# Patient Record
Sex: FEMALE | Race: WHITE | NOT HISPANIC OR LATINO | Employment: OTHER | URBAN - METROPOLITAN AREA
[De-identification: names, ages, dates, MRNs, and addresses within clinical notes are randomized per-mention and may not be internally consistent; named-entity substitution may affect disease eponyms.]

---

## 2017-07-10 ENCOUNTER — OFFICE VISIT (OUTPATIENT)
Dept: FAMILY MEDICINE CLINIC | Facility: CLINIC | Age: 72
End: 2017-07-10

## 2017-07-10 VITALS
HEART RATE: 64 BPM | DIASTOLIC BLOOD PRESSURE: 80 MMHG | WEIGHT: 162 LBS | SYSTOLIC BLOOD PRESSURE: 130 MMHG | HEIGHT: 67 IN | RESPIRATION RATE: 16 BRPM | OXYGEN SATURATION: 94 % | TEMPERATURE: 98 F | BODY MASS INDEX: 25.43 KG/M2

## 2017-07-10 DIAGNOSIS — R09.81 SINUS CONGESTION: Primary | ICD-10-CM

## 2017-07-10 PROCEDURE — 99213 OFFICE O/P EST LOW 20 MIN: CPT | Performed by: NURSE PRACTITIONER

## 2017-07-10 RX ORDER — FLUTICASONE PROPIONATE 50 MCG
2 SPRAY, SUSPENSION (ML) NASAL DAILY
Qty: 1 EACH | Refills: 11 | Status: SHIPPED | OUTPATIENT
Start: 2017-07-10 | End: 2017-08-09

## 2017-07-10 NOTE — PROGRESS NOTES
Subjective   Brandee Cabello is a 71 y.o. female.     History of Present Illness   Brandee Cabello 71 y.o. female who presents for evaluation of sinus pressure and congestion. Symptoms include ear pressure, congestion, nasal blockage and headache.  Onset of symptoms was 1 week ago, unchanged since that time. Patient denies shortness of breath, wheezing, fever.   Evaluation to date: none Treatment to date:  OTC antihistamines.     The following portions of the patient's history were reviewed and updated as appropriate: allergies, current medications, past family history, past medical history, past social history, past surgical history and problem list.    Review of Systems   Constitutional: Negative for chills and fever.   HENT: Positive for sinus pressure. Negative for ear pain, postnasal drip and rhinorrhea.    Respiratory: Negative for cough.    Neurological: Positive for headaches.       Objective   Physical Exam   Constitutional: She is oriented to person, place, and time. She appears well-developed and well-nourished.   HENT:   Right Ear: Tympanic membrane, external ear and ear canal normal.   Left Ear: Tympanic membrane, external ear and ear canal normal.   Nose: Right sinus exhibits no maxillary sinus tenderness and no frontal sinus tenderness. Left sinus exhibits no maxillary sinus tenderness and no frontal sinus tenderness.   Mouth/Throat: Uvula is midline and oropharynx is clear and moist.   Cardiovascular: Normal rate and regular rhythm.    Pulmonary/Chest: Effort normal and breath sounds normal.   Neurological: She is alert and oriented to person, place, and time.   Skin: Skin is warm.   Psychiatric: She has a normal mood and affect. Judgment normal.   Vitals reviewed.      Assessment/Plan   Brandee was seen today for sinusitis.    Diagnoses and all orders for this visit:    Sinus congestion  -     fluticasone (FLONASE) 50 MCG/ACT nasal spray; 2 sprays into each nostril Daily for 30 days.

## 2017-09-26 ENCOUNTER — TELEPHONE (OUTPATIENT)
Dept: FAMILY MEDICINE CLINIC | Facility: CLINIC | Age: 72
End: 2017-09-26

## 2017-09-26 DIAGNOSIS — E78.2 MIXED HYPERLIPIDEMIA: ICD-10-CM

## 2017-09-26 RX ORDER — SIMVASTATIN 40 MG
40 TABLET ORAL NIGHTLY
Qty: 30 TABLET | Refills: 0 | Status: SHIPPED | OUTPATIENT
Start: 2017-09-26 | End: 2017-10-02 | Stop reason: SDUPTHER

## 2017-10-02 ENCOUNTER — OFFICE VISIT (OUTPATIENT)
Dept: FAMILY MEDICINE CLINIC | Facility: CLINIC | Age: 72
End: 2017-10-02

## 2017-10-02 VITALS
WEIGHT: 163 LBS | SYSTOLIC BLOOD PRESSURE: 132 MMHG | HEART RATE: 68 BPM | HEIGHT: 67 IN | BODY MASS INDEX: 25.58 KG/M2 | DIASTOLIC BLOOD PRESSURE: 78 MMHG | RESPIRATION RATE: 16 BRPM

## 2017-10-02 DIAGNOSIS — I10 ESSENTIAL HYPERTENSION: Primary | ICD-10-CM

## 2017-10-02 DIAGNOSIS — E78.2 MIXED HYPERLIPIDEMIA: ICD-10-CM

## 2017-10-02 PROCEDURE — 99213 OFFICE O/P EST LOW 20 MIN: CPT | Performed by: FAMILY MEDICINE

## 2017-10-02 RX ORDER — AMLODIPINE BESYLATE 10 MG/1
10 TABLET ORAL DAILY
Qty: 90 TABLET | Refills: 3 | Status: SHIPPED | OUTPATIENT
Start: 2017-10-02 | End: 2018-11-02 | Stop reason: SDUPTHER

## 2017-10-02 RX ORDER — SIMVASTATIN 40 MG
40 TABLET ORAL NIGHTLY
Qty: 90 TABLET | Refills: 3 | Status: SHIPPED | OUTPATIENT
Start: 2017-10-02 | End: 2018-11-02

## 2017-10-02 NOTE — PROGRESS NOTES
"Subjective   Brandee Cabello is a 72 y.o. female.     History of Present Illness     Chief Complaint:   Chief Complaint   Patient presents with   • Hypertension     MED REFILL - PT DECLINES MEDICARE WELLNESS EXAM TODAY - NO LABS DONE    • Hyperlipidemia       Brandee Cabello 72 y.o. female who presents today for Medical Management of the below listed issues and medication refills.  she has a problem list of   Patient Active Problem List   Diagnosis   • Hyperlipidemia   • Hypertension   • Reflux esophagitis   .  Since the last visit, she has overall felt well.  she has been compliant with   Current Outpatient Prescriptions:   •  amLODIPine (NORVASC) 10 MG tablet, Take 1 tablet by mouth Daily., Disp: 90 tablet, Rfl: 3  •  simvastatin (ZOCOR) 40 MG tablet, Take 1 tablet by mouth Every Night., Disp: 90 tablet, Rfl: 3.  she denies medication side effects.    All of the chronic condition(s) listed above are stable w/o issues.    /78  Pulse 68  Resp 16  Ht 67\" (170.2 cm)  Wt 163 lb (73.9 kg)  BMI 25.53 kg/m2    Results for orders placed or performed in visit on 09/02/16   Lipid panel   Result Value Ref Range    Total Cholesterol 184 100 - 199 mg/dL    Triglycerides 78 0 - 149 mg/dL    HDL Cholesterol 69 >39 mg/dL    VLDL Cholesterol 16 5 - 40 mg/dL    LDL Cholesterol  99 0 - 99 mg/dL   POCT Urinalysis Dipstick, Automated   Result Value Ref Range    Color Yellow Yellow, Straw, Dark Yellow, [place]    Clarity, UA Clear Clear    Glucose, UA Negative Negative mg/dL    Bilirubin Negative Negative    Ketones, UA Negative Negative    Specific Gravity  1.020 1.005 - 1.030    Blood, UA Negative Negative    pH, Urine 7.0 5.0 - 8.0    Protein, POC Negative Negative mg/dL    Urobilinogen, UA Normal Normal    Leukocytes Small (1+) (A) Negative    Nitrite, UA Negative Negative         The following portions of the patient's history were reviewed and updated as appropriate: allergies, current medications, past family history, past " medical history, past social history, past surgical history and problem list.    Review of Systems   Constitutional: Negative for activity change, chills, fatigue and fever.   Respiratory: Negative for cough and chest tightness.    Cardiovascular: Negative for chest pain and palpitations.   Gastrointestinal: Negative for abdominal pain and nausea.   Endocrine: Negative for cold intolerance.   Psychiatric/Behavioral: Negative for behavioral problems and dysphoric mood.       Objective   Physical Exam   Constitutional: She appears well-developed and well-nourished.   Neck: Neck supple. No thyromegaly present.   Cardiovascular: Normal rate and regular rhythm.    No murmur heard.  Pulmonary/Chest: Effort normal and breath sounds normal.   Abdominal: Bowel sounds are normal.   Psychiatric: She has a normal mood and affect. Her behavior is normal.   Nursing note and vitals reviewed.      Assessment/Plan   Brandee was seen today for hypertension and hyperlipidemia.    Diagnoses and all orders for this visit:    Essential hypertension  -     amLODIPine (NORVASC) 10 MG tablet; Take 1 tablet by mouth Daily.  -     Comprehensive metabolic panel  -     Lipid panel  -     CBC and Differential  -     TSH    Mixed hyperlipidemia  -     simvastatin (ZOCOR) 40 MG tablet; Take 1 tablet by mouth Every Night.  -     Lipid panel

## 2017-10-21 LAB
ALBUMIN SERPL-MCNC: 4.1 G/DL (ref 3.5–4.8)
ALBUMIN/GLOB SERPL: 1.6 {RATIO} (ref 1.2–2.2)
ALP SERPL-CCNC: 91 IU/L (ref 39–117)
ALT SERPL-CCNC: 17 IU/L (ref 0–32)
AST SERPL-CCNC: 19 IU/L (ref 0–40)
BASOPHILS # BLD AUTO: 0 X10E3/UL (ref 0–0.2)
BASOPHILS NFR BLD AUTO: 1 %
BILIRUB SERPL-MCNC: 0.4 MG/DL (ref 0–1.2)
BUN SERPL-MCNC: 10 MG/DL (ref 8–27)
BUN/CREAT SERPL: 14 (ref 12–28)
CALCIUM SERPL-MCNC: 9.3 MG/DL (ref 8.7–10.3)
CHLORIDE SERPL-SCNC: 100 MMOL/L (ref 96–106)
CHOLEST SERPL-MCNC: 175 MG/DL (ref 100–199)
CO2 SERPL-SCNC: 25 MMOL/L (ref 18–29)
CREAT SERPL-MCNC: 0.74 MG/DL (ref 0.57–1)
EOSINOPHIL # BLD AUTO: 0.1 X10E3/UL (ref 0–0.4)
EOSINOPHIL NFR BLD AUTO: 2 %
ERYTHROCYTE [DISTWIDTH] IN BLOOD BY AUTOMATED COUNT: 15.1 % (ref 12.3–15.4)
GFR SERPLBLD CREATININE-BSD FMLA CKD-EPI: 80 ML/MIN/1.73
GFR SERPLBLD CREATININE-BSD FMLA CKD-EPI: 92 ML/MIN/1.73
GLOBULIN SER CALC-MCNC: 2.6 G/DL (ref 1.5–4.5)
GLUCOSE SERPL-MCNC: 93 MG/DL (ref 65–99)
HCT VFR BLD AUTO: 41.7 % (ref 34–46.6)
HDLC SERPL-MCNC: 56 MG/DL
HGB BLD-MCNC: 13.8 G/DL (ref 11.1–15.9)
IMM GRANULOCYTES # BLD: 0 X10E3/UL (ref 0–0.1)
IMM GRANULOCYTES NFR BLD: 0 %
LDLC SERPL CALC-MCNC: 102 MG/DL (ref 0–99)
LYMPHOCYTES # BLD AUTO: 1.7 X10E3/UL (ref 0.7–3.1)
LYMPHOCYTES NFR BLD AUTO: 31 %
MCH RBC QN AUTO: 29.9 PG (ref 26.6–33)
MCHC RBC AUTO-ENTMCNC: 33.1 G/DL (ref 31.5–35.7)
MCV RBC AUTO: 90 FL (ref 79–97)
MONOCYTES # BLD AUTO: 0.6 X10E3/UL (ref 0.1–0.9)
MONOCYTES NFR BLD AUTO: 10 %
NEUTROPHILS # BLD AUTO: 3 X10E3/UL (ref 1.4–7)
NEUTROPHILS NFR BLD AUTO: 56 %
PLATELET # BLD AUTO: 300 X10E3/UL (ref 150–379)
POTASSIUM SERPL-SCNC: 4.2 MMOL/L (ref 3.5–5.2)
PROT SERPL-MCNC: 6.7 G/DL (ref 6–8.5)
RBC # BLD AUTO: 4.62 X10E6/UL (ref 3.77–5.28)
SODIUM SERPL-SCNC: 145 MMOL/L (ref 134–144)
TRIGL SERPL-MCNC: 86 MG/DL (ref 0–149)
TSH SERPL DL<=0.005 MIU/L-ACNC: 2.01 UIU/ML (ref 0.45–4.5)
VLDLC SERPL CALC-MCNC: 17 MG/DL (ref 5–40)
WBC # BLD AUTO: 5.4 X10E3/UL (ref 3.4–10.8)

## 2018-03-21 ENCOUNTER — OFFICE VISIT (OUTPATIENT)
Dept: FAMILY MEDICINE CLINIC | Facility: CLINIC | Age: 73
End: 2018-03-21

## 2018-03-21 VITALS
DIASTOLIC BLOOD PRESSURE: 83 MMHG | HEART RATE: 69 BPM | BODY MASS INDEX: 25.46 KG/M2 | SYSTOLIC BLOOD PRESSURE: 154 MMHG | OXYGEN SATURATION: 97 % | WEIGHT: 168 LBS | RESPIRATION RATE: 16 BRPM | HEIGHT: 68 IN | TEMPERATURE: 98.6 F

## 2018-03-21 DIAGNOSIS — J06.9 ACUTE URI: Primary | ICD-10-CM

## 2018-03-21 PROCEDURE — 99213 OFFICE O/P EST LOW 20 MIN: CPT | Performed by: FAMILY MEDICINE

## 2018-03-21 RX ORDER — AZITHROMYCIN 250 MG/1
TABLET, FILM COATED ORAL
Qty: 6 TABLET | Refills: 0 | Status: SHIPPED | OUTPATIENT
Start: 2018-03-21 | End: 2018-11-02

## 2018-03-21 RX ORDER — BENZONATATE 200 MG/1
200 CAPSULE ORAL 3 TIMES DAILY PRN
Qty: 21 CAPSULE | Refills: 1 | Status: SHIPPED | OUTPATIENT
Start: 2018-03-21 | End: 2018-11-02

## 2018-03-21 NOTE — PROGRESS NOTES
"Subjective   Brandee Cabello is a 72 y.o. female.     CC: URI    History of Present Illness     Brandee Cabello 72 y.o. female who presents for evaluation of cough, frontal headache pain.. Symptoms include congestion, post nasal drip and dry cough.  Onset of symptoms was 3 days ago, unchanged since that time. Patient denies hemoptysis.   Evaluation to date: none Treatment to date:  none.       The following portions of the patient's history were reviewed and updated as appropriate: allergies, current medications, past family history, past medical history, past social history, past surgical history and problem list.    Review of Systems   Constitutional: Negative for activity change, chills, fatigue and fever.   HENT: Positive for congestion and sinus pressure.    Respiratory: Positive for cough. Negative for chest tightness.    Cardiovascular: Negative for chest pain and palpitations.   Gastrointestinal: Negative for abdominal pain and nausea.   Endocrine: Negative for cold intolerance.   Psychiatric/Behavioral: Negative for behavioral problems and dysphoric mood.     /83   Pulse 69   Temp 98.6 °F (37 °C)   Resp 16   Ht 171.5 cm (67.5\")   Wt 76.2 kg (168 lb)   SpO2 97%   BMI 25.92 kg/m²     Objective   Physical Exam   Constitutional: She appears well-developed and well-nourished.   HENT:   Nose: Right sinus exhibits maxillary sinus tenderness. Left sinus exhibits maxillary sinus tenderness.   Neck: Neck supple. No thyromegaly present.   Cardiovascular: Normal rate and regular rhythm.    No murmur heard.  Pulmonary/Chest: Effort normal and breath sounds normal.   Lymphadenopathy:     She has no cervical adenopathy.   Psychiatric: She has a normal mood and affect. Her behavior is normal.   Nursing note and vitals reviewed.      Assessment/Plan   Brandee was seen today for cough, nasal congestion and sinusitis.    Diagnoses and all orders for this visit:    Acute URI  -     azithromycin (ZITHROMAX Z-SEE) 250 MG " tablet; Take 2 tablets the first day, then 1 tablet daily for 4 days.  -     benzonatate (TESSALON) 200 MG capsule; Take 1 capsule by mouth 3 (Three) Times a Day As Needed for Cough.    Pt to fill the Z-Pack if worsens.

## 2018-09-21 ENCOUNTER — OFFICE VISIT (OUTPATIENT)
Dept: RETAIL CLINIC | Facility: CLINIC | Age: 73
End: 2018-09-21

## 2018-09-21 VITALS
OXYGEN SATURATION: 100 % | HEART RATE: 81 BPM | TEMPERATURE: 98.4 F | DIASTOLIC BLOOD PRESSURE: 79 MMHG | SYSTOLIC BLOOD PRESSURE: 132 MMHG | RESPIRATION RATE: 18 BRPM

## 2018-09-21 DIAGNOSIS — J32.9 SINUSITIS, UNSPECIFIED CHRONICITY, UNSPECIFIED LOCATION: Primary | ICD-10-CM

## 2018-09-21 PROCEDURE — 99213 OFFICE O/P EST LOW 20 MIN: CPT | Performed by: NURSE PRACTITIONER

## 2018-09-21 RX ORDER — GUAIFENESIN 600 MG/1
600 TABLET, EXTENDED RELEASE ORAL 2 TIMES DAILY
Qty: 28 TABLET | Refills: 0 | Status: SHIPPED | OUTPATIENT
Start: 2018-09-21 | End: 2018-10-05

## 2018-09-21 RX ORDER — AZITHROMYCIN 250 MG/1
TABLET, FILM COATED ORAL
Qty: 6 TABLET | Refills: 0 | Status: SHIPPED | OUTPATIENT
Start: 2018-09-21 | End: 2018-11-02

## 2018-09-21 RX ORDER — PREDNISONE 20 MG/1
20 TABLET ORAL 2 TIMES DAILY
Qty: 10 TABLET | Refills: 0 | Status: SHIPPED | OUTPATIENT
Start: 2018-09-21 | End: 2018-11-02

## 2018-09-21 NOTE — PATIENT INSTRUCTIONS

## 2018-09-21 NOTE — PROGRESS NOTES
Subjective:     Brandee Cabello is a 73 y.o.     Sinusitis   This is a new (some antibiotic that tears her stomach up but knows whatever PCP gave her last time works) problem. The current episode started 1 to 4 weeks ago. There has been no fever. Associated symptoms include congestion (mild), ear pain (full), headaches, sinus pressure (teeth hurt) and a sore throat (mild). Past treatments include nothing.         The following portions of the patient's history were reviewed and updated as appropriate: allergies, current medications, past family history, past medical history, past social history, past surgical history and problem list.      Review of Systems   HENT: Positive for congestion (mild), ear pain (full), postnasal drip, sinus pain, sinus pressure (teeth hurt) and sore throat (mild).    Respiratory: Negative.    Cardiovascular:        Hx:hypertension   Neurological: Positive for headaches.         Objective:      Physical Exam   HENT:   Head: Normocephalic and atraumatic.   Right Ear: Ear canal normal. Right ear middle ear effusion: mild.   Left Ear: Ear canal normal. Left ear middle ear effusion: mild.   Nose: Right sinus exhibits frontal sinus tenderness. Right sinus exhibits no maxillary sinus tenderness. Left sinus exhibits frontal sinus tenderness. Left sinus exhibits no maxillary sinus tenderness.   Mild nasal congestion   Cardiovascular: Normal rate, regular rhythm, S1 normal, S2 normal and normal heart sounds.    Pulmonary/Chest: Effort normal and breath sounds normal.   Lymphadenopathy:     She has no cervical adenopathy.   Vitals reviewed.          Diagnoses and all orders for this visit:    Sinusitis, unspecified chronicity, unspecified location    Other orders  -     azithromycin (ZITHROMAX) 250 MG tablet; Take 2 tablets the first day, then 1 tablet daily for 4 days.  -     predniSONE (DELTASONE) 20 MG tablet; Take 1 tablet by mouth 2 (Two) Times a Day.  -     guaiFENesin (MUCINEX) 600 MG 12 hr  tablet; Take 1 tablet by mouth 2 (Two) Times a Day for 14 days.

## 2018-10-17 DIAGNOSIS — E78.2 MIXED HYPERLIPIDEMIA: ICD-10-CM

## 2018-10-17 RX ORDER — SIMVASTATIN 40 MG
40 TABLET ORAL NIGHTLY
Qty: 90 TABLET | Refills: 3 | OUTPATIENT
Start: 2018-10-17

## 2018-11-02 ENCOUNTER — OFFICE VISIT (OUTPATIENT)
Dept: FAMILY MEDICINE CLINIC | Facility: CLINIC | Age: 73
End: 2018-11-02

## 2018-11-02 VITALS
HEIGHT: 68 IN | HEART RATE: 68 BPM | BODY MASS INDEX: 25.31 KG/M2 | SYSTOLIC BLOOD PRESSURE: 139 MMHG | WEIGHT: 167 LBS | TEMPERATURE: 97.5 F | RESPIRATION RATE: 16 BRPM | DIASTOLIC BLOOD PRESSURE: 87 MMHG

## 2018-11-02 DIAGNOSIS — E78.2 MIXED HYPERLIPIDEMIA: ICD-10-CM

## 2018-11-02 DIAGNOSIS — I10 ESSENTIAL HYPERTENSION: ICD-10-CM

## 2018-11-02 PROCEDURE — 99214 OFFICE O/P EST MOD 30 MIN: CPT | Performed by: NURSE PRACTITIONER

## 2018-11-02 RX ORDER — SIMVASTATIN 40 MG
40 TABLET ORAL NIGHTLY
Qty: 90 TABLET | Refills: 3 | Status: CANCELLED | OUTPATIENT
Start: 2018-11-02

## 2018-11-02 RX ORDER — AMLODIPINE BESYLATE 10 MG/1
10 TABLET ORAL DAILY
Qty: 90 TABLET | Refills: 3 | Status: SHIPPED | OUTPATIENT
Start: 2018-11-02 | End: 2019-12-30 | Stop reason: SDUPTHER

## 2018-11-02 RX ORDER — ATORVASTATIN CALCIUM 20 MG/1
20 TABLET, FILM COATED ORAL DAILY
Qty: 90 TABLET | Refills: 3 | Status: SHIPPED | OUTPATIENT
Start: 2018-11-02 | End: 2019-10-27 | Stop reason: SDUPTHER

## 2018-11-02 NOTE — PROGRESS NOTES
Subjective   Brandee Cabello is a 73 y.o. female.     History of Present Illness   Brandee Cabello 73 y.o. female who presents today for routine follow up check and medication refills.  she has a history of   Patient Active Problem List   Diagnosis   • Hyperlipidemia   • Hypertension   • Reflux esophagitis   .  Since the last visit, she has overall felt well.  She has Hypertenision and is well controlled on medication and Hyperlipidemia and is well controlled on medication.  she has been compliant with current medications have reviewed them.  The patient denies medication side effects.    Results for orders placed or performed in visit on 10/02/17   Comprehensive metabolic panel   Result Value Ref Range    Glucose 93 65 - 99 mg/dL    BUN 10 8 - 27 mg/dL    Creatinine 0.74 0.57 - 1.00 mg/dL    eGFR Non African Am 80 >59 mL/min/1.73    eGFR African Am 92 >59 mL/min/1.73    BUN/Creatinine Ratio 14 12 - 28    Sodium 145 (H) 134 - 144 mmol/L    Potassium 4.2 3.5 - 5.2 mmol/L    Chloride 100 96 - 106 mmol/L    Total CO2 25 18 - 29 mmol/L    Calcium 9.3 8.7 - 10.3 mg/dL    Total Protein 6.7 6.0 - 8.5 g/dL    Albumin 4.1 3.5 - 4.8 g/dL    Globulin 2.6 1.5 - 4.5 g/dL    A/G Ratio 1.6 1.2 - 2.2    Total Bilirubin 0.4 0.0 - 1.2 mg/dL    Alkaline Phosphatase 91 39 - 117 IU/L    AST (SGOT) 19 0 - 40 IU/L    ALT (SGPT) 17 0 - 32 IU/L   Lipid panel   Result Value Ref Range    Total Cholesterol 175 100 - 199 mg/dL    Triglycerides 86 0 - 149 mg/dL    HDL Cholesterol 56 >39 mg/dL    VLDL Cholesterol 17 5 - 40 mg/dL    LDL Cholesterol  102 (H) 0 - 99 mg/dL   TSH   Result Value Ref Range    TSH 2.010 0.450 - 4.500 uIU/mL   CBC and Differential   Result Value Ref Range    WBC 5.4 3.4 - 10.8 x10E3/uL    RBC 4.62 3.77 - 5.28 x10E6/uL    Hemoglobin 13.8 11.1 - 15.9 g/dL    Hematocrit 41.7 34.0 - 46.6 %    MCV 90 79 - 97 fL    MCH 29.9 26.6 - 33.0 pg    MCHC 33.1 31.5 - 35.7 g/dL    RDW 15.1 12.3 - 15.4 %    Platelets 300 150 - 379 x10E3/uL     Neutrophil Rel % 56 Not Estab. %    Lymphocyte Rel % 31 Not Estab. %    Monocyte Rel % 10 Not Estab. %    Eosinophil Rel % 2 Not Estab. %    Basophil Rel % 1 Not Estab. %    Neutrophils Absolute 3.0 1.4 - 7.0 x10E3/uL    Lymphocytes Absolute 1.7 0.7 - 3.1 x10E3/uL    Monocytes Absolute 0.6 0.1 - 0.9 x10E3/uL    Eosinophils Absolute 0.1 0.0 - 0.4 x10E3/uL    Basophils Absolute 0.0 0.0 - 0.2 x10E3/uL    Immature Granulocyte Rel % 0 Not Estab. %    Immature Grans Absolute 0.0 0.0 - 0.1 x10E3/uL       The following portions of the patient's history were reviewed and updated as appropriate: allergies, current medications, past family history, past medical history, past social history, past surgical history and problem list.    Review of Systems   Constitutional: Negative for unexpected weight change.   Respiratory: Negative for shortness of breath.    Cardiovascular: Negative for chest pain and palpitations.   Endocrine: Negative for cold intolerance, heat intolerance, polydipsia, polyphagia and polyuria.   Musculoskeletal: Positive for myalgias.   Psychiatric/Behavioral: Negative for behavioral problems.       Objective   Physical Exam   Constitutional: She is oriented to person, place, and time. She appears well-developed and well-nourished.   Neck: Carotid bruit is not present.   Cardiovascular: Normal rate and regular rhythm.    Pulmonary/Chest: Effort normal and breath sounds normal.   Neurological: She is alert and oriented to person, place, and time.   Psychiatric: She has a normal mood and affect. Judgment normal.   Nursing note and vitals reviewed.      Assessment/Plan   Diagnoses and all orders for this visit:    Mixed hyperlipidemia  -     Comprehensive metabolic panel  -     Lipid panel  -     CBC and Differential  -     TSH    Essential hypertension  -     Comprehensive metabolic panel  -     Lipid panel  -     CBC and Differential  -     TSH  -     amLODIPine (NORVASC) 10 MG tablet; Take 1 tablet by mouth  Daily.    Other orders  -     Cancel: simvastatin (ZOCOR) 40 MG tablet; Take 1 tablet by mouth Every Night.  -     atorvastatin (LIPITOR) 20 MG tablet; Take 1 tablet by mouth Daily. For cholesterol          Changed simvastatin to atorvastatin due to lower extremity myalgia.

## 2018-11-03 LAB
ALBUMIN SERPL-MCNC: 4.5 G/DL (ref 3.5–5.2)
ALBUMIN/GLOB SERPL: 1.7 G/DL
ALP SERPL-CCNC: 95 U/L (ref 39–117)
ALT SERPL-CCNC: 15 U/L (ref 1–33)
AST SERPL-CCNC: 19 U/L (ref 1–32)
BASOPHILS # BLD AUTO: 0.03 10*3/MM3 (ref 0–0.2)
BASOPHILS NFR BLD AUTO: 0.6 % (ref 0–1.5)
BILIRUB SERPL-MCNC: 0.5 MG/DL (ref 0.1–1.2)
BUN SERPL-MCNC: 12 MG/DL (ref 8–23)
BUN/CREAT SERPL: 17.1 (ref 7–25)
CALCIUM SERPL-MCNC: 10.1 MG/DL (ref 8.6–10.5)
CHLORIDE SERPL-SCNC: 103 MMOL/L (ref 98–107)
CHOLEST SERPL-MCNC: 174 MG/DL (ref 0–200)
CO2 SERPL-SCNC: 28 MMOL/L (ref 22–29)
CREAT SERPL-MCNC: 0.7 MG/DL (ref 0.57–1)
EOSINOPHIL # BLD AUTO: 0.06 10*3/MM3 (ref 0–0.7)
EOSINOPHIL NFR BLD AUTO: 1.2 % (ref 0.3–6.2)
ERYTHROCYTE [DISTWIDTH] IN BLOOD BY AUTOMATED COUNT: 13.9 % (ref 11.7–13)
GLOBULIN SER CALC-MCNC: 2.7 GM/DL
GLUCOSE SERPL-MCNC: 104 MG/DL (ref 65–99)
HCT VFR BLD AUTO: 46.6 % (ref 35.6–45.5)
HDLC SERPL-MCNC: 58 MG/DL (ref 40–60)
HGB BLD-MCNC: 14.9 G/DL (ref 11.9–15.5)
IMM GRANULOCYTES # BLD: 0 10*3/MM3 (ref 0–0.03)
IMM GRANULOCYTES NFR BLD: 0 % (ref 0–0.5)
LDLC SERPL CALC-MCNC: 90 MG/DL (ref 0–100)
LYMPHOCYTES # BLD AUTO: 1.43 10*3/MM3 (ref 0.9–4.8)
LYMPHOCYTES NFR BLD AUTO: 28 % (ref 19.6–45.3)
MCH RBC QN AUTO: 30.2 PG (ref 26.9–32)
MCHC RBC AUTO-ENTMCNC: 32 G/DL (ref 32.4–36.3)
MCV RBC AUTO: 94.3 FL (ref 80.5–98.2)
MONOCYTES # BLD AUTO: 0.5 10*3/MM3 (ref 0.2–1.2)
MONOCYTES NFR BLD AUTO: 9.8 % (ref 5–12)
NEUTROPHILS # BLD AUTO: 3.09 10*3/MM3 (ref 1.9–8.1)
NEUTROPHILS NFR BLD AUTO: 60.4 % (ref 42.7–76)
PLATELET # BLD AUTO: 322 10*3/MM3 (ref 140–500)
POTASSIUM SERPL-SCNC: 4.3 MMOL/L (ref 3.5–5.2)
PROT SERPL-MCNC: 7.2 G/DL (ref 6–8.5)
RBC # BLD AUTO: 4.94 10*6/MM3 (ref 3.9–5.2)
SODIUM SERPL-SCNC: 144 MMOL/L (ref 136–145)
TRIGL SERPL-MCNC: 130 MG/DL (ref 0–150)
TSH SERPL DL<=0.005 MIU/L-ACNC: 2.43 MIU/ML (ref 0.27–4.2)
VLDLC SERPL CALC-MCNC: 26 MG/DL (ref 5–40)
WBC # BLD AUTO: 5.11 10*3/MM3 (ref 4.5–10.7)

## 2019-01-23 ENCOUNTER — OFFICE VISIT (OUTPATIENT)
Dept: RETAIL CLINIC | Facility: CLINIC | Age: 74
End: 2019-01-23

## 2019-01-23 VITALS
OXYGEN SATURATION: 98 % | SYSTOLIC BLOOD PRESSURE: 112 MMHG | HEART RATE: 80 BPM | DIASTOLIC BLOOD PRESSURE: 72 MMHG | RESPIRATION RATE: 18 BRPM | TEMPERATURE: 98.7 F

## 2019-01-23 DIAGNOSIS — J01.00 ACUTE MAXILLARY SINUSITIS, RECURRENCE NOT SPECIFIED: Primary | ICD-10-CM

## 2019-01-23 LAB
EXPIRATION DATE: NORMAL
FLUAV AG NPH QL: NEGATIVE
FLUBV AG NPH QL: NEGATIVE
INTERNAL CONTROL: NORMAL
Lab: NORMAL

## 2019-01-23 PROCEDURE — 87804 INFLUENZA ASSAY W/OPTIC: CPT | Performed by: NURSE PRACTITIONER

## 2019-01-23 PROCEDURE — 99213 OFFICE O/P EST LOW 20 MIN: CPT | Performed by: NURSE PRACTITIONER

## 2019-01-23 RX ORDER — AZITHROMYCIN 250 MG/1
TABLET, FILM COATED ORAL
Qty: 6 TABLET | Refills: 0 | Status: SHIPPED | OUTPATIENT
Start: 2019-01-23 | End: 2019-04-27 | Stop reason: SDUPTHER

## 2019-01-23 RX ORDER — AZITHROMYCIN 250 MG/1
250 TABLET, FILM COATED ORAL DAILY
COMMUNITY
End: 2019-01-23

## 2019-01-23 RX ORDER — AMOXICILLIN AND CLAVULANATE POTASSIUM 875; 125 MG/1; MG/1
1 TABLET, FILM COATED ORAL 2 TIMES DAILY
Qty: 20 TABLET | Refills: 0 | Status: SHIPPED | OUTPATIENT
Start: 2019-01-23 | End: 2019-01-23

## 2019-01-23 NOTE — PATIENT INSTRUCTIONS

## 2019-01-23 NOTE — PROGRESS NOTES
Subjective   Brandee Cabello is a 73 y.o. female.     Sinusitis   This is a new problem. The current episode started 1 to 4 weeks ago. The problem has been gradually worsening since onset. There has been no fever. Associated symptoms include congestion, headaches and sinus pressure. Pertinent negatives include no coughing or sore throat. Past treatments include nothing.        The following portions of the patient's history were reviewed and updated as appropriate: allergies, current medications, past family history, past medical history, past social history, past surgical history and problem list.    Review of Systems   HENT: Positive for congestion and sinus pressure. Negative for sore throat.    Respiratory: Negative for cough.    Cardiovascular: Negative.    Gastrointestinal: Negative.    Musculoskeletal: Negative.    Neurological: Positive for headache.       Objective   Physical Exam   Constitutional: She is cooperative. No distress.   HENT:   Head: Normocephalic.   Right Ear: Hearing, external ear and ear canal normal. A middle ear effusion is present.   Left Ear: Hearing, external ear and ear canal normal. A middle ear effusion is present.   Nose: Mucosal edema and congestion present. Right sinus exhibits maxillary sinus tenderness. Left sinus exhibits maxillary sinus tenderness.   Mouth/Throat: Oropharyngeal exudate present.   Eyes: Conjunctivae, EOM and lids are normal. Pupils are equal, round, and reactive to light.   Neck: Trachea normal and full passive range of motion without pain.   Cardiovascular: Normal rate, regular rhythm and normal pulses.   Pulmonary/Chest: Effort normal and breath sounds normal.   Lymphadenopathy:        Head (right side): Posterior auricular adenopathy present.     She has cervical adenopathy.        Right cervical: Superficial cervical adenopathy present.        Left cervical: Superficial cervical adenopathy present.   Neurological: She is alert.   Skin: Skin is warm. Capillary  refill takes less than 2 seconds.   Psychiatric: She has a normal mood and affect. Her speech is normal and behavior is normal.   Vitals reviewed.        Assessment/Plan   Brandee was seen today for sinusitis.    Diagnoses and all orders for this visit:    Acute maxillary sinusitis, recurrence not specified    Other orders  -     amoxicillin-clavulanate (AUGMENTIN) 875-125 MG per tablet; Take 1 tablet by mouth 2 (Two) Times a Day for 10 days.

## 2019-04-27 ENCOUNTER — OFFICE VISIT (OUTPATIENT)
Dept: RETAIL CLINIC | Facility: CLINIC | Age: 74
End: 2019-04-27

## 2019-04-27 VITALS
RESPIRATION RATE: 18 BRPM | WEIGHT: 154 LBS | BODY MASS INDEX: 23.34 KG/M2 | HEART RATE: 67 BPM | OXYGEN SATURATION: 99 % | HEIGHT: 68 IN | SYSTOLIC BLOOD PRESSURE: 116 MMHG | DIASTOLIC BLOOD PRESSURE: 74 MMHG | TEMPERATURE: 98 F

## 2019-04-27 DIAGNOSIS — J01.40 ACUTE PANSINUSITIS, RECURRENCE NOT SPECIFIED: Primary | ICD-10-CM

## 2019-04-27 PROCEDURE — 99213 OFFICE O/P EST LOW 20 MIN: CPT | Performed by: NURSE PRACTITIONER

## 2019-04-27 RX ORDER — AZITHROMYCIN 250 MG/1
TABLET, FILM COATED ORAL
Qty: 6 TABLET | Refills: 0 | Status: SHIPPED | OUTPATIENT
Start: 2019-04-27 | End: 2019-12-30

## 2019-04-27 NOTE — PATIENT INSTRUCTIONS

## 2019-04-27 NOTE — PROGRESS NOTES
"Subjective   Brandee Cabello is a 73 y.o. female.     /74 (BP Location: Left arm, Patient Position: Sitting, Cuff Size: Adult)   Pulse 67   Temp 98 °F (36.7 °C) (Oral)   Resp 18   Ht 171.5 cm (67.5\")   Wt 69.9 kg (154 lb)   SpO2 99%   BMI 23.76 kg/m²       Sinusitis   This is a new problem. The current episode started 1 to 4 weeks ago. The problem has been gradually worsening since onset. There has been no fever. Her pain is at a severity of 4/10. The pain is mild. Associated symptoms include congestion, ear pain, headaches and sinus pressure. Pertinent negatives include no sneezing. Treatments tried: taking allegra  The treatment provided no relief.        The following portions of the patient's history were reviewed and updated as appropriate: current medications, past family history, past medical history, past social history, past surgical history and problem list.    Review of Systems   Constitutional: Positive for fatigue.        Pt losing weight, exercising, changing her diet    HENT: Positive for congestion, ear pain and sinus pressure. Negative for sneezing.    Eyes: Negative.    Cardiovascular: Negative.    Gastrointestinal: Negative.    Genitourinary: Negative.    Musculoskeletal: Negative.    Skin: Negative.    Neurological: Positive for headache.       Objective   Physical Exam   Constitutional: She is oriented to person, place, and time. She appears well-developed and well-nourished.   HENT:   Head: Normocephalic and atraumatic.   Right Ear: External ear normal.   Left Ear: External ear normal.   Nose: Sinus tenderness and congestion present. Right sinus exhibits frontal sinus tenderness. Left sinus exhibits frontal sinus tenderness.   Mouth/Throat: Oropharynx is clear and moist.   Eyes: Conjunctivae and EOM are normal. Pupils are equal, round, and reactive to light.   Cardiovascular: Normal rate, regular rhythm and normal heart sounds.   Pulmonary/Chest: Effort normal and breath sounds " normal.   Abdominal: Soft. Bowel sounds are normal.   Neurological: She is alert and oriented to person, place, and time.   Skin: Skin is warm and dry. Capillary refill takes less than 2 seconds.   Psychiatric: She has a normal mood and affect.   Vitals reviewed.        Assessment/Plan   Brandee was seen today for sinusitis.    Diagnoses and all orders for this visit:    Acute pansinusitis, recurrence not specified  -     azithromycin (ZITHROMAX Z-SEE) 250 MG tablet; Take 2 tablets the first day, then 1 tablet daily for 4 days.          Sinusitis, Adult  Sinusitis is soreness and swelling (inflammation) of your sinuses. Sinuses are hollow spaces in the bones around your face. They are located:  · Around your eyes.  · In the middle of your forehead.  · Behind your nose.  · In your cheekbones.    Your sinuses and nasal passages are lined with a stringy fluid (mucus). Mucus normally drains out of your sinuses. Swelling can trap mucus in your sinuses. This lets germs like bacteria or viruses grow, and that leads to infection. Most of the time, sinusitis is caused by a virus.  If bacteria is causing your infection, your doctor may have you wait and see if you get better without antibiotic medicine. You may be prescribed antibiotics if you have:  · A very bad infection.  · A weak disease-fighting (immune) system.    Follow these instructions at home:  Medicines  · Take, use, or apply over-the-counter and prescription medicines only as told by your doctor. These may include nasal sprays.  · If you were prescribed an antibiotic, take it as told by your doctor. Do not stop taking the antibiotic even if you start to feel better.  Hydrate and Humidify  · Drink enough water to keep your pee (urine) pale yellow.  · Use a cool mist humidifier to keep the humidity level in your home above 50%.  · Breathe in steam for 10-15 minutes, 3-4 times a day or as told by your doctor. You can do this in the bathroom while a hot shower is  running.  · Try not to spend time in cool or dry air.  Rest  · Rest as much as possible.  · Sleep with your head raised (elevated).  · Make sure to get enough sleep each night.  General instructions  · Put a warm, moist washcloth on your face 3-4 times a day, or as often as told by your doctor. This will help with discomfort.  · Wash your hands often with soap and water. If there is no soap and water, use hand .  · Do not smoke. Avoid being around people who are smoking (secondhand smoke).  · Keep all follow-up visits as told by your doctor. This is important.  Contact a doctor if:  · You have a fever.  · Your symptoms get worse.  · Your symptoms do not get better within 10 days.  Get help right away if:  · You have a very bad headache.  · You cannot stop throwing up (vomiting).  · You have pain or swelling around your face or eyes.  · You have trouble seeing.  · You feel confused.  · Your neck is stiff.  · You have trouble breathing.  This information is not intended to replace advice given to you by your health care provider. Make sure you discuss any questions you have with your health care provider.  Document Released: 06/05/2009 Document Revised: 06/29/2018 Document Reviewed: 10/12/2016  [employer] Patient Education © 2019 [employer]

## 2019-10-27 DIAGNOSIS — E55.9 VITAMIN D DEFICIENCY: ICD-10-CM

## 2019-10-27 DIAGNOSIS — I10 ESSENTIAL HYPERTENSION: ICD-10-CM

## 2019-10-27 DIAGNOSIS — E78.2 MIXED HYPERLIPIDEMIA: Primary | ICD-10-CM

## 2019-10-27 RX ORDER — ATORVASTATIN CALCIUM 20 MG/1
20 TABLET, FILM COATED ORAL DAILY
Qty: 90 TABLET | Refills: 0 | Status: SHIPPED | OUTPATIENT
Start: 2019-10-27 | End: 2019-12-30 | Stop reason: SDUPTHER

## 2019-12-11 DIAGNOSIS — I10 ESSENTIAL HYPERTENSION: ICD-10-CM

## 2019-12-12 RX ORDER — AMLODIPINE BESYLATE 10 MG/1
TABLET ORAL
Qty: 90 TABLET | Refills: 3 | OUTPATIENT
Start: 2019-12-12

## 2019-12-30 ENCOUNTER — OFFICE VISIT (OUTPATIENT)
Dept: FAMILY MEDICINE CLINIC | Facility: CLINIC | Age: 74
End: 2019-12-30

## 2019-12-30 VITALS
SYSTOLIC BLOOD PRESSURE: 118 MMHG | TEMPERATURE: 97.5 F | WEIGHT: 156 LBS | HEART RATE: 70 BPM | DIASTOLIC BLOOD PRESSURE: 82 MMHG | HEIGHT: 68 IN | RESPIRATION RATE: 16 BRPM | BODY MASS INDEX: 23.64 KG/M2

## 2019-12-30 DIAGNOSIS — E78.00 PURE HYPERCHOLESTEROLEMIA: ICD-10-CM

## 2019-12-30 DIAGNOSIS — Z00.00 MEDICARE ANNUAL WELLNESS VISIT, SUBSEQUENT: Primary | ICD-10-CM

## 2019-12-30 DIAGNOSIS — I10 ESSENTIAL HYPERTENSION: ICD-10-CM

## 2019-12-30 PROCEDURE — 99213 OFFICE O/P EST LOW 20 MIN: CPT | Performed by: FAMILY MEDICINE

## 2019-12-30 PROCEDURE — G0439 PPPS, SUBSEQ VISIT: HCPCS | Performed by: FAMILY MEDICINE

## 2019-12-30 RX ORDER — AMLODIPINE BESYLATE 10 MG/1
10 TABLET ORAL DAILY
Qty: 90 TABLET | Refills: 3 | Status: SHIPPED | OUTPATIENT
Start: 2019-12-30 | End: 2020-08-13 | Stop reason: SDUPTHER

## 2019-12-30 RX ORDER — BIMATOPROST 0.01 %
DROPS OPHTHALMIC (EYE)
COMMUNITY
Start: 2019-12-27

## 2019-12-30 RX ORDER — ATORVASTATIN CALCIUM 20 MG/1
20 TABLET, FILM COATED ORAL DAILY
Qty: 90 TABLET | Refills: 3 | Status: SHIPPED | OUTPATIENT
Start: 2019-12-30 | End: 2020-11-17 | Stop reason: SDUPTHER

## 2019-12-30 NOTE — PROGRESS NOTES
The ABCs of the Annual Wellness Visit  Subsequent Medicare Wellness Visit    Chief Complaint   Patient presents with   • Hypertension     med refill    • Hyperlipidemia   • medicare wellness       Subjective   History of Present Illness:  Brandee Cabello is a 74 y.o. female who presents for a Subsequent Medicare Wellness Visit.    HEALTH RISK ASSESSMENT    Recent Hospitalizations:  No hospitalization(s) within the last year.    Current Medical Providers:  Patient Care Team:  Minor Blanc MD as PCP - General (Family Medicine)  Alphonse Sanabria MD as PCP - Claims Attributed    Smoking Status:  Social History     Tobacco Use   Smoking Status Never Smoker   Smokeless Tobacco Never Used       Alcohol Consumption:  Social History     Substance and Sexual Activity   Alcohol Use Yes    Comment: SOCIAL        Depression Screen:   PHQ-2/PHQ-9 Depression Screening 12/30/2019   Little interest or pleasure in doing things 0   Feeling down, depressed, or hopeless 0   Trouble falling or staying asleep, or sleeping too much 0   Feeling tired or having little energy 0   Poor appetite or overeating 0   Feeling bad about yourself - or that you are a failure or have let yourself or your family down 0   Trouble concentrating on things, such as reading the newspaper or watching television 0   Moving or speaking so slowly that other people could have noticed. Or the opposite - being so fidgety or restless that you have been moving around a lot more than usual 0   Thoughts that you would be better off dead, or of hurting yourself in some way 0   Total Score 0   If you checked off any problems, how difficult have these problems made it for you to do your work, take care of things at home, or get along with other people? Not difficult at all       Fall Risk Screen:  STEADI Fall Risk Assessment was completed, and patient is at LOW risk for falls.Assessment completed on:12/30/2019    Health Habits and Functional and Cognitive  Screening:  Functional & Cognitive Status 12/30/2019   Do you have difficulty preparing food and eating? No   Do you have difficulty bathing yourself, getting dressed or grooming yourself? No   Do you have difficulty using the toilet? No   Do you have difficulty moving around from place to place? No   Do you have trouble with steps or getting out of a bed or a chair? No   Current Diet Well Balanced Diet   Dental Exam Up to date   Eye Exam Up to date   Exercise (times per week) 5 times per week   Current Exercise Activities Include Walking   Do you need help using the phone?  No   Are you deaf or do you have serious difficulty hearing?  No   Do you need help with transportation? No   Do you need help shopping? No   Do you need help preparing meals?  No   Do you need help with housework?  No   Do you need help with laundry? No   Do you need help taking your medications? No   Do you need help managing money? No   Do you ever drive or ride in a car without wearing a seat belt? No   Have you felt unusual stress, anger or loneliness in the last month? No   Who do you live with? Spouse   If you need help, do you have trouble finding someone available to you? Yes   Have you been bothered in the last four weeks by sexual problems? No   Do you have difficulty concentrating, remembering or making decisions? No         Does the patient have evidence of cognitive impairment? No    Asprin use counseling:Does not need ASA (and currently is not on it)    Age-appropriate Screening Schedule:  Refer to the list below for future screening recommendations based on patient's age, sex and/or medical conditions. Orders for these recommended tests are listed in the plan section. The patient has been provided with a written plan.    Health Maintenance   Topic Date Due   • LIPID PANEL  11/02/2019   • ZOSTER VACCINE (2 of 2) 01/07/2021 (Originally 10/28/2016)   • TDAP/TD VACCINES (2 - Td) 09/02/2026   • INFLUENZA VACCINE  Completed   •  "MAMMOGRAM  Discontinued   • COLONOSCOPY  Discontinued          The following portions of the patient's history were reviewed and updated as appropriate: allergies, current medications, past family history, past medical history, past social history, past surgical history and problem list.    Outpatient Medications Prior to Visit   Medication Sig Dispense Refill   • amLODIPine (NORVASC) 10 MG tablet Take 1 tablet by mouth Daily. 90 tablet 3   • atorvastatin (LIPITOR) 20 MG tablet TAKE 1 TABLET BY MOUTH DAILY. FOR CHOLESTEROL 90 tablet 0   • LUMIGAN 0.01 % ophthalmic drops      • azithromycin (ZITHROMAX Z-SEE) 250 MG tablet Take 2 tablets the first day, then 1 tablet daily for 4 days. 6 tablet 0     No facility-administered medications prior to visit.        Patient Active Problem List   Diagnosis   • Hyperlipidemia   • Hypertension   • Reflux esophagitis       Advanced Care Planning:  Patient does not have an advance directive - information provided to the patient today    Review of Systems    Compared to one year ago, the patient feels her physical health is the same.  Compared to one year ago, the patient feels her mental health is the same.    Reviewed chart for potential of high risk medication in the elderly: yes  Reviewed chart for potential of harmful drug interactions in the elderly:yes    Objective         Vitals:    12/30/19 1023   BP: 118/82   Pulse: 70   Resp: 16   Temp: 97.5 °F (36.4 °C)   TempSrc: Oral   Weight: 70.8 kg (156 lb)   Height: 171.5 cm (67.5\")   PainSc: 0-No pain       Body mass index is 24.07 kg/m².  Discussed the patient's BMI with her. The BMI is in the acceptable range.    Physical Exam          Assessment/Plan   Medicare Risks and Personalized Health Plan  CMS Preventative Services Quick Reference  age    The above risks/problems have been discussed with the patient.  Pertinent information has been shared with the patient in the After Visit Summary.  Follow up plans and orders are seen " below in the Assessment/Plan Section.    Diagnoses and all orders for this visit:    1. Medicare annual wellness visit, subsequent (Primary)    2. Essential hypertension  -     amLODIPine (NORVASC) 10 MG tablet; Take 1 tablet by mouth Daily.  Dispense: 90 tablet; Refill: 3    3. Pure hypercholesterolemia  -     atorvastatin (LIPITOR) 20 MG tablet; Take 1 tablet by mouth Daily. For cholesterol  Dispense: 90 tablet; Refill: 3      Follow Up:  Return in about 1 year (around 12/30/2020) for Recheck.     An After Visit Summary and PPPS were given to the patient.

## 2019-12-30 NOTE — PROGRESS NOTES
"Subjective   Brandee Cabello is a 74 y.o. female.     History of Present Illness     Chief Complaint:   Chief Complaint   Patient presents with   • Hypertension     med refill    • Hyperlipidemia   • medicare wellness       Brandee Cabello 74 y.o. female who presents today for Medical Management of the below listed issues and medication refills.  she has a problem list of   Patient Active Problem List   Diagnosis   • Hyperlipidemia   • Hypertension   • Reflux esophagitis   .  Since the last visit, she has overall felt well.  she has been compliant with   Current Outpatient Medications:   •  amLODIPine (NORVASC) 10 MG tablet, Take 1 tablet by mouth Daily., Disp: 90 tablet, Rfl: 3  •  atorvastatin (LIPITOR) 20 MG tablet, Take 1 tablet by mouth Daily. For cholesterol, Disp: 90 tablet, Rfl: 3  •  LUMIGAN 0.01 % ophthalmic drops, , Disp: , Rfl: .  she denies medication side effects.    All of the other chronic condition(s) listed above are stable w/o issues.    /82   Pulse 70   Temp 97.5 °F (36.4 °C) (Oral)   Resp 16   Ht 171.5 cm (67.5\")   Wt 70.8 kg (156 lb)   BMI 24.07 kg/m²     Results for orders placed or performed in visit on 01/23/19   POC Influenza A / B   Result Value Ref Range    Rapid Influenza A Ag Negative Negative    Rapid Influenza B Ag Negative Negative    Internal Control Passed Passed    Lot Number [id number]     Expiration Date 09/30/2020            The following portions of the patient's history were reviewed and updated as appropriate: allergies, current medications, past family history, past medical history, past social history, past surgical history and problem list.    Review of Systems   Constitutional: Negative for activity change, chills, fatigue and fever.   Respiratory: Negative for cough and chest tightness.    Cardiovascular: Negative for chest pain and palpitations.   Gastrointestinal: Negative for abdominal pain and nausea.   Endocrine: Negative for cold intolerance. "   Psychiatric/Behavioral: Negative for behavioral problems and dysphoric mood.       Objective   Physical Exam   Constitutional: She appears well-developed and well-nourished.   Neck: Neck supple. No thyromegaly present.   Cardiovascular: Normal rate and regular rhythm.   No murmur heard.  Pulmonary/Chest: Effort normal and breath sounds normal.   Abdominal: Bowel sounds are normal. There is no tenderness.   Neurological: She is alert.   Psychiatric: She has a normal mood and affect. Her behavior is normal.   Nursing note and vitals reviewed.  Labs ordered and pt to complete.    Assessment/Plan   Brandee was seen today for hypertension, hyperlipidemia and medicare wellness.    Diagnoses and all orders for this visit:    Medicare annual wellness visit, subsequent    Essential hypertension  -     amLODIPine (NORVASC) 10 MG tablet; Take 1 tablet by mouth Daily.    Pure hypercholesterolemia  -     atorvastatin (LIPITOR) 20 MG tablet; Take 1 tablet by mouth Daily. For cholesterol    Discussed Cologuard (pt against c-scope) and will think about it.

## 2019-12-30 NOTE — PATIENT INSTRUCTIONS
Medicare Wellness  Personal Prevention Plan of Service     Date of Office Visit:  2019  Encounter Provider:  Minor Blanc MD  Place of Service:  [facility]  Patient Name: Brandee Cabello  :  1945    As part of the Medicare Wellness portion of your visit today, we are providing you with this personalized preventive plan of services (PPPS). This plan is based upon recommendations of the [place] Preventive Services Task Force (USPSTF) and the Advisory Committee on Immunization Practices (ACIP).    This lists the preventive care services that should be considered, and provides dates of when you are due. Items listed as completed are up-to-date and do not require any further intervention.    Health Maintenance   Topic Date Due   • LIPID PANEL  2019   • ZOSTER VACCINE (2 of 2) 2021 (Originally 10/28/2016)   • MEDICARE ANNUAL WELLNESS  2020   • TDAP/TD VACCINES (2 - Td) 2026   • Pneumococcal Vaccine Once at 65 Years Old  Completed   • INFLUENZA VACCINE  Completed   • MAMMOGRAM  Discontinued   • COLONOSCOPY  Discontinued       No orders of the defined types were placed in this encounter.      Return in about 1 year (around 2020) for Recheck.

## 2019-12-31 ENCOUNTER — TELEPHONE (OUTPATIENT)
Dept: FAMILY MEDICINE CLINIC | Facility: CLINIC | Age: 74
End: 2019-12-31

## 2019-12-31 NOTE — TELEPHONE ENCOUNTER
Pt called wanting to know which supplement you are recommending for her to take for her knees?  Thanks

## 2020-01-07 LAB
25(OH)D3+25(OH)D2 SERPL-MCNC: 30.7 NG/ML (ref 30–100)
ALBUMIN SERPL-MCNC: 4.4 G/DL (ref 3.5–4.8)
ALBUMIN/GLOB SERPL: 1.7 {RATIO} (ref 1.2–2.2)
ALP SERPL-CCNC: 95 IU/L (ref 39–117)
ALT SERPL-CCNC: 23 IU/L (ref 0–32)
AST SERPL-CCNC: 22 IU/L (ref 0–40)
BASOPHILS # BLD AUTO: 0 X10E3/UL (ref 0–0.2)
BASOPHILS NFR BLD AUTO: 1 %
BILIRUB SERPL-MCNC: 0.5 MG/DL (ref 0–1.2)
BUN SERPL-MCNC: 12 MG/DL (ref 8–27)
BUN/CREAT SERPL: 18 (ref 12–28)
CALCIUM SERPL-MCNC: 9.9 MG/DL (ref 8.7–10.3)
CHLORIDE SERPL-SCNC: 104 MMOL/L (ref 96–106)
CHOLEST SERPL-MCNC: 189 MG/DL (ref 100–199)
CO2 SERPL-SCNC: 26 MMOL/L (ref 20–29)
CREAT SERPL-MCNC: 0.66 MG/DL (ref 0.57–1)
EOSINOPHIL # BLD AUTO: 0.1 X10E3/UL (ref 0–0.4)
EOSINOPHIL NFR BLD AUTO: 3 %
ERYTHROCYTE [DISTWIDTH] IN BLOOD BY AUTOMATED COUNT: 13.5 % (ref 11.7–15.4)
FOLATE SERPL-MCNC: 15.7 NG/ML
GLOBULIN SER CALC-MCNC: 2.6 G/DL (ref 1.5–4.5)
GLUCOSE SERPL-MCNC: 94 MG/DL (ref 65–99)
HCT VFR BLD AUTO: 47.9 % (ref 34–46.6)
HDLC SERPL-MCNC: 69 MG/DL
HGB BLD-MCNC: 15.7 G/DL (ref 11.1–15.9)
IMM GRANULOCYTES # BLD AUTO: 0 X10E3/UL (ref 0–0.1)
IMM GRANULOCYTES NFR BLD AUTO: 0 %
LDLC SERPL CALC-MCNC: 102 MG/DL (ref 0–99)
LYMPHOCYTES # BLD AUTO: 1.8 X10E3/UL (ref 0.7–3.1)
LYMPHOCYTES NFR BLD AUTO: 39 %
MCH RBC QN AUTO: 31.3 PG (ref 26.6–33)
MCHC RBC AUTO-ENTMCNC: 32.8 G/DL (ref 31.5–35.7)
MCV RBC AUTO: 96 FL (ref 79–97)
MONOCYTES # BLD AUTO: 0.4 X10E3/UL (ref 0.1–0.9)
MONOCYTES NFR BLD AUTO: 9 %
NEUTROPHILS # BLD AUTO: 2.2 X10E3/UL (ref 1.4–7)
NEUTROPHILS NFR BLD AUTO: 48 %
PLATELET # BLD AUTO: 304 X10E3/UL (ref 150–450)
POTASSIUM SERPL-SCNC: 5.2 MMOL/L (ref 3.5–5.2)
PROT SERPL-MCNC: 7 G/DL (ref 6–8.5)
RBC # BLD AUTO: 5.01 X10E6/UL (ref 3.77–5.28)
SODIUM SERPL-SCNC: 146 MMOL/L (ref 134–144)
T3FREE SERPL-MCNC: 2.9 PG/ML (ref 2–4.4)
T4 FREE SERPL-MCNC: 1.18 NG/DL (ref 0.82–1.77)
TRIGL SERPL-MCNC: 91 MG/DL (ref 0–149)
TSH SERPL DL<=0.005 MIU/L-ACNC: 4.04 UIU/ML (ref 0.45–4.5)
VIT B12 SERPL-MCNC: 707 PG/ML (ref 232–1245)
VLDLC SERPL CALC-MCNC: 18 MG/DL (ref 5–40)
WBC # BLD AUTO: 4.5 X10E3/UL (ref 3.4–10.8)

## 2020-08-13 DIAGNOSIS — I10 ESSENTIAL HYPERTENSION: ICD-10-CM

## 2020-08-13 RX ORDER — AMLODIPINE BESYLATE 10 MG/1
10 TABLET ORAL DAILY
Qty: 30 TABLET | Refills: 0 | Status: SHIPPED | OUTPATIENT
Start: 2020-08-13 | End: 2020-09-10

## 2020-08-13 NOTE — TELEPHONE ENCOUNTER
Pt called stating that she was cleaning and accidentally threw away her amlodipine bottle with med in it.  Pt asking if we will send rx for 30 until ins will approve refills.  Pt due to f/u in December.

## 2020-09-10 DIAGNOSIS — I10 ESSENTIAL HYPERTENSION: ICD-10-CM

## 2020-09-10 RX ORDER — AMLODIPINE BESYLATE 10 MG/1
TABLET ORAL
Qty: 30 TABLET | Refills: 0 | Status: SHIPPED | OUTPATIENT
Start: 2020-09-10 | End: 2020-11-13 | Stop reason: SDUPTHER

## 2020-10-03 DIAGNOSIS — E78.00 PURE HYPERCHOLESTEROLEMIA: ICD-10-CM

## 2020-10-05 RX ORDER — ATORVASTATIN CALCIUM 20 MG/1
TABLET, FILM COATED ORAL
Qty: 90 TABLET | Refills: 3 | OUTPATIENT
Start: 2020-10-05

## 2020-11-09 DIAGNOSIS — E78.00 PURE HYPERCHOLESTEROLEMIA: ICD-10-CM

## 2020-11-09 DIAGNOSIS — I10 ESSENTIAL HYPERTENSION: ICD-10-CM

## 2020-11-10 DIAGNOSIS — E78.00 PURE HYPERCHOLESTEROLEMIA: ICD-10-CM

## 2020-11-10 RX ORDER — ATORVASTATIN CALCIUM 20 MG/1
TABLET, FILM COATED ORAL
Qty: 90 TABLET | Refills: 3 | OUTPATIENT
Start: 2020-11-10

## 2020-11-10 RX ORDER — AMLODIPINE BESYLATE 10 MG/1
TABLET ORAL
Qty: 90 TABLET | Refills: 3 | OUTPATIENT
Start: 2020-11-10

## 2020-11-13 DIAGNOSIS — E78.00 PURE HYPERCHOLESTEROLEMIA: ICD-10-CM

## 2020-11-13 DIAGNOSIS — I10 ESSENTIAL HYPERTENSION: ICD-10-CM

## 2020-11-13 RX ORDER — AMLODIPINE BESYLATE 10 MG/1
10 TABLET ORAL DAILY
Qty: 14 TABLET | Refills: 0 | Status: SHIPPED | OUTPATIENT
Start: 2020-11-13 | End: 2020-11-17 | Stop reason: SDUPTHER

## 2020-11-13 NOTE — TELEPHONE ENCOUNTER
Pt has appt antony on 11/13/2020, and needs a few days of Amlodipine. Please ok this for pt she is out of meds.

## 2020-11-16 RX ORDER — ATORVASTATIN CALCIUM 20 MG/1
TABLET, FILM COATED ORAL
Qty: 90 TABLET | Refills: 3 | OUTPATIENT
Start: 2020-11-16

## 2020-11-17 ENCOUNTER — TELEMEDICINE (OUTPATIENT)
Dept: FAMILY MEDICINE CLINIC | Facility: CLINIC | Age: 75
End: 2020-11-17

## 2020-11-17 DIAGNOSIS — I10 ESSENTIAL HYPERTENSION: Primary | ICD-10-CM

## 2020-11-17 DIAGNOSIS — E78.00 PURE HYPERCHOLESTEROLEMIA: ICD-10-CM

## 2020-11-17 DIAGNOSIS — E55.9 VITAMIN D DEFICIENCY: ICD-10-CM

## 2020-11-17 PROCEDURE — 99442 PR PHYS/QHP TELEPHONE EVALUATION 11-20 MIN: CPT | Performed by: FAMILY MEDICINE

## 2020-11-17 RX ORDER — AMLODIPINE BESYLATE 10 MG/1
10 TABLET ORAL DAILY
Qty: 90 TABLET | Refills: 3 | Status: SHIPPED | OUTPATIENT
Start: 2020-11-17 | End: 2021-11-08 | Stop reason: SDUPTHER

## 2020-11-17 RX ORDER — ATORVASTATIN CALCIUM 20 MG/1
20 TABLET, FILM COATED ORAL DAILY
Qty: 90 TABLET | Refills: 3 | Status: SHIPPED | OUTPATIENT
Start: 2020-11-17 | End: 2021-11-08 | Stop reason: SDUPTHER

## 2020-11-17 NOTE — PROGRESS NOTES
Subjective   Brandee Cabello is a 75 y.o. female.     CC: Video Visit for Medical Management    History of Present Illness     Chief Complaint:   Chief Complaint   Patient presents with   • Hypertension     med refill - no labs - video visit    • Hyperlipidemia       Brandee Cabello 75 y.o. female who presents today for Medical Management of the below listed issues and medication refills.  she has a problem list of   Patient Active Problem List   Diagnosis   • Hyperlipidemia   • Hypertension   • Reflux esophagitis   • Vitamin D deficiency   .  Since the last visit, she has overall felt well.  she has been compliant with   Current Outpatient Medications:   •  amLODIPine (NORVASC) 10 MG tablet, Take 1 tablet by mouth Daily., Disp: 90 tablet, Rfl: 3  •  atorvastatin (LIPITOR) 20 MG tablet, Take 1 tablet by mouth Daily. For cholesterol, Disp: 90 tablet, Rfl: 3  •  LUMIGAN 0.01 % ophthalmic drops, , Disp: , Rfl: .  she denies medication side effects.    All of the other chronic condition(s) listed above are stable w/o issues.    There were no vitals taken for this visit.    Results for orders placed or performed in visit on 10/27/19   Comprehensive metabolic panel    Specimen: Blood   Result Value Ref Range    Glucose 94 65 - 99 mg/dL    BUN 12 8 - 27 mg/dL    Creatinine 0.66 0.57 - 1.00 mg/dL    eGFR Non African Am 86 >59 mL/min/1.73    eGFR African Am 99 >59 mL/min/1.73    BUN/Creatinine Ratio 18 12 - 28    Sodium 146 (H) 134 - 144 mmol/L    Potassium 5.2 3.5 - 5.2 mmol/L    Chloride 104 96 - 106 mmol/L    Total CO2 26 20 - 29 mmol/L    Calcium 9.9 8.7 - 10.3 mg/dL    Total Protein 7.0 6.0 - 8.5 g/dL    Albumin 4.4 3.5 - 4.8 g/dL    Globulin 2.6 1.5 - 4.5 g/dL    A/G Ratio 1.7 1.2 - 2.2    Total Bilirubin 0.5 0.0 - 1.2 mg/dL    Alkaline Phosphatase 95 39 - 117 IU/L    AST (SGOT) 22 0 - 40 IU/L    ALT (SGPT) 23 0 - 32 IU/L   Lipid panel    Specimen: Blood   Result Value Ref Range    Total Cholesterol 189 100 - 199 mg/dL     Triglycerides 91 0 - 149 mg/dL    HDL Cholesterol 69 >39 mg/dL    VLDL Cholesterol 18 5 - 40 mg/dL    LDL Cholesterol  102 (H) 0 - 99 mg/dL   TSH    Specimen: Blood   Result Value Ref Range    TSH 4.040 0.450 - 4.500 uIU/mL   T3, Free    Specimen: Blood   Result Value Ref Range    T3, Free 2.9 2.0 - 4.4 pg/mL   T4, Free    Specimen: Blood   Result Value Ref Range    Free T4 1.18 0.82 - 1.77 ng/dL   Vitamin D 25 Hydroxy    Specimen: Blood   Result Value Ref Range    25 Hydroxy, Vitamin D 30.7 30.0 - 100.0 ng/mL   Vitamin B12    Specimen: Blood   Result Value Ref Range    Vitamin B-12 707 232 - 1,245 pg/mL   Folate    Specimen: Blood   Result Value Ref Range    Folate 15.7 >3.0 ng/mL   CBC and Differential    Specimen: Blood   Result Value Ref Range    WBC 4.5 3.4 - 10.8 x10E3/uL    RBC 5.01 3.77 - 5.28 x10E6/uL    Hemoglobin 15.7 11.1 - 15.9 g/dL    Hematocrit 47.9 (H) 34.0 - 46.6 %    MCV 96 79 - 97 fL    MCH 31.3 26.6 - 33.0 pg    MCHC 32.8 31.5 - 35.7 g/dL    RDW 13.5 11.7 - 15.4 %    Platelets 304 150 - 450 x10E3/uL    Neutrophil Rel % 48 Not Estab. %    Lymphocyte Rel % 39 Not Estab. %    Monocyte Rel % 9 Not Estab. %    Eosinophil Rel % 3 Not Estab. %    Basophil Rel % 1 Not Estab. %    Neutrophils Absolute 2.2 1.4 - 7.0 x10E3/uL    Lymphocytes Absolute 1.8 0.7 - 3.1 x10E3/uL    Monocytes Absolute 0.4 0.1 - 0.9 x10E3/uL    Eosinophils Absolute 0.1 0.0 - 0.4 x10E3/uL    Basophils Absolute 0.0 0.0 - 0.2 x10E3/uL    Immature Granulocyte Rel % 0 Not Estab. %    Immature Grans Absolute 0.0 0.0 - 0.1 x10E3/uL           The following portions of the patient's history were reviewed and updated as appropriate: allergies, current medications, past family history, past medical history, past social history, past surgical history and problem list.    Review of Systems   Constitutional: Negative for activity change, chills and fever.   Respiratory: Negative for cough.    Cardiovascular: Negative for chest pain.    Psychiatric/Behavioral: Negative for dysphoric mood.       Objective   Physical Exam  Constitutional:       General: She is not in acute distress.     Appearance: She is well-developed.   Pulmonary:      Effort: Pulmonary effort is normal.   Neurological:      Mental Status: She is alert and oriented to person, place, and time.   Psychiatric:         Behavior: Behavior normal.         Thought Content: Thought content normal.         Assessment/Plan   Diagnoses and all orders for this visit:    1. Essential hypertension (Primary)  -     amLODIPine (NORVASC) 10 MG tablet; Take 1 tablet by mouth Daily.  Dispense: 90 tablet; Refill: 3  -     Comprehensive metabolic panel  -     Lipid panel  -     CBC and Differential  -     TSH    2. Pure hypercholesterolemia  -     atorvastatin (LIPITOR) 20 MG tablet; Take 1 tablet by mouth Daily. For cholesterol  Dispense: 90 tablet; Refill: 3  -     Lipid panel    3. Vitamin D deficiency  -     Vitamin D 25 Hydroxy    Spent  16   minutes with chart and interview and consent for this encounter given by the patient.  You have chosen to receive care through a telehealth visit.  Do you consent to use a TELEPHONE connection for your medical care today? Yes  Unable to complete visit using a video connection to the patient. A phone visit was used to complete this visits. Total time of discussion was 16 minutes.

## 2021-01-16 ENCOUNTER — APPOINTMENT (OUTPATIENT)
Dept: GENERAL RADIOLOGY | Facility: HOSPITAL | Age: 76
End: 2021-01-16

## 2021-01-16 PROCEDURE — 70220 X-RAY EXAM OF SINUSES: CPT | Performed by: GENERAL PRACTICE

## 2021-09-06 DIAGNOSIS — E78.00 PURE HYPERCHOLESTEROLEMIA: ICD-10-CM

## 2021-09-07 RX ORDER — ATORVASTATIN CALCIUM 20 MG/1
TABLET, FILM COATED ORAL
Qty: 90 TABLET | Refills: 3 | OUTPATIENT
Start: 2021-09-07

## 2021-11-04 ENCOUNTER — TELEPHONE (OUTPATIENT)
Dept: FAMILY MEDICINE CLINIC | Facility: CLINIC | Age: 76
End: 2021-11-04

## 2021-11-04 NOTE — TELEPHONE ENCOUNTER
Caller: Brandee Cabello    Relationship: Self    Best call back number:    What is the best time to reach you:     Who are you requesting to speak with (clinical staff, provider,  specific staff member):     Do you know the name of the person who called:     What was the call regarding: PATIENT IS CALLING IN STATING THAT SHE HAS 5 DAYS OF HER MEDICATION LEFT AND THE BOTTELE SAYS NO REFILLS.  SHE WANTS TO KNOW IF SHE NEEDS TO SCHEDULE AN APPOINTMENT TO SEE DR CHOWDARY BEFORE THIS CAN BE FILLED.      Do you require a callback: YES

## 2021-11-09 ENCOUNTER — OFFICE VISIT (OUTPATIENT)
Dept: FAMILY MEDICINE CLINIC | Facility: CLINIC | Age: 76
End: 2021-11-09

## 2021-11-09 VITALS
HEIGHT: 61 IN | RESPIRATION RATE: 16 BRPM | DIASTOLIC BLOOD PRESSURE: 87 MMHG | WEIGHT: 157 LBS | BODY MASS INDEX: 29.64 KG/M2 | TEMPERATURE: 96.9 F | HEART RATE: 70 BPM | SYSTOLIC BLOOD PRESSURE: 144 MMHG

## 2021-11-09 DIAGNOSIS — E78.00 PURE HYPERCHOLESTEROLEMIA: Chronic | ICD-10-CM

## 2021-11-09 DIAGNOSIS — E55.9 VITAMIN D DEFICIENCY: ICD-10-CM

## 2021-11-09 DIAGNOSIS — Z23 IMMUNIZATION DUE: ICD-10-CM

## 2021-11-09 DIAGNOSIS — I10 ESSENTIAL HYPERTENSION: Primary | Chronic | ICD-10-CM

## 2021-11-09 PROCEDURE — 99214 OFFICE O/P EST MOD 30 MIN: CPT | Performed by: FAMILY MEDICINE

## 2021-11-09 PROCEDURE — G0008 ADMIN INFLUENZA VIRUS VAC: HCPCS | Performed by: FAMILY MEDICINE

## 2021-11-09 PROCEDURE — 90662 IIV NO PRSV INCREASED AG IM: CPT | Performed by: FAMILY MEDICINE

## 2021-11-09 RX ORDER — AMLODIPINE BESYLATE 10 MG/1
10 TABLET ORAL DAILY
Qty: 90 TABLET | Refills: 3 | Status: SHIPPED | OUTPATIENT
Start: 2021-11-09 | End: 2022-10-24 | Stop reason: SDUPTHER

## 2021-11-09 RX ORDER — ATORVASTATIN CALCIUM 20 MG/1
20 TABLET, FILM COATED ORAL DAILY
Qty: 90 TABLET | Refills: 3 | Status: SHIPPED | OUTPATIENT
Start: 2021-11-09 | End: 2022-10-24 | Stop reason: SDUPTHER

## 2021-11-09 NOTE — PROGRESS NOTES
"Subjective   Brandee Cabello is a 76 y.o. female.     History of Present Illness     Chief Complaint:   Chief Complaint   Patient presents with   • Hypertension     med refill / no labs     • Hyperlipidemia     pt declines medicare wellness today per pt    • Immunizations     HD FLU VACCINE RIGHT DELTOID TODAY A        Brandee Cabello 76 y.o. female who presents today for Medical Management of the below listed issues and medication refills. She  has a problem list of   Patient Active Problem List   Diagnosis   • Hyperlipidemia   • Hypertension   • Reflux esophagitis   • Vitamin D deficiency   .  Since the last visit, She has overall felt well.  she has been compliant with   Current Outpatient Medications:   •  amLODIPine (NORVASC) 10 MG tablet, Take 1 tablet by mouth Daily., Disp: 90 tablet, Rfl: 3  •  atorvastatin (LIPITOR) 20 MG tablet, Take 1 tablet by mouth Daily. For cholesterol, Disp: 90 tablet, Rfl: 3  •  LUMIGAN 0.01 % ophthalmic drops, , Disp: , Rfl:   •  meloxicam (MOBIC) 7.5 MG tablet, Take 1 tablet by mouth Daily. For neck pain and headache., Disp: 15 tablet, Rfl: 0.  She denies medication side effects.    PT's BPs at home 120's/70's.    All of the other chronic condition(s) listed above are stable w/o issues.    /87   Pulse 70   Temp 96.9 °F (36.1 °C) (Oral)   Resp 16   Ht 153.7 cm (60.5\")   Wt 71.2 kg (157 lb)   BMI 30.16 kg/m²     Results for orders placed or performed during the hospital encounter of 01/02/21   COVID-19,[facility] ROUTINE, NP/OP SWAB IN TRANSPORT MEDIA OR ESWAB 72 HR TAT - Swab, Nasopharynx    Specimen: Nasopharynx; Swab   Result Value Ref Range    SARS-CoV-2, ESTELA Not Detected Not Detected             The following portions of the patient's history were reviewed and updated as appropriate: allergies, current medications, past family history, past medical history, past social history, past surgical history, and problem list.    Review of Systems   Constitutional: Negative for " activity change, chills and fever.   Respiratory: Negative for cough.    Cardiovascular: Negative for chest pain.   Psychiatric/Behavioral: Negative for dysphoric mood.       Objective   Physical Exam  Constitutional:       General: She is not in acute distress.     Appearance: She is well-developed.   Cardiovascular:      Rate and Rhythm: Normal rate and regular rhythm.   Pulmonary:      Effort: Pulmonary effort is normal.      Breath sounds: Normal breath sounds.   Neurological:      Mental Status: She is alert and oriented to person, place, and time.   Psychiatric:         Behavior: Behavior normal.         Thought Content: Thought content normal.           Assessment/Plan   Diagnoses and all orders for this visit:    1. Essential hypertension (Primary)  -     amLODIPine (NORVASC) 10 MG tablet; Take 1 tablet by mouth Daily.  Dispense: 90 tablet; Refill: 3  -     Comprehensive metabolic panel  -     Lipid panel  -     CBC and Differential  -     TSH    2. Pure hypercholesterolemia  -     atorvastatin (LIPITOR) 20 MG tablet; Take 1 tablet by mouth Daily. For cholesterol  Dispense: 90 tablet; Refill: 3  -     Lipid panel    3. Vitamin D deficiency  -     Vitamin D 25 Hydroxy

## 2021-11-10 LAB
25(OH)D3+25(OH)D2 SERPL-MCNC: 39.1 NG/ML (ref 30–100)
ALBUMIN SERPL-MCNC: 4.4 G/DL (ref 3.7–4.7)
ALBUMIN/GLOB SERPL: 1.8 {RATIO} (ref 1.2–2.2)
ALP SERPL-CCNC: 106 IU/L (ref 44–121)
ALT SERPL-CCNC: 14 IU/L (ref 0–32)
AST SERPL-CCNC: 18 IU/L (ref 0–40)
BASOPHILS # BLD AUTO: 0.1 X10E3/UL (ref 0–0.2)
BASOPHILS NFR BLD AUTO: 1 %
BILIRUB SERPL-MCNC: 0.7 MG/DL (ref 0–1.2)
BUN SERPL-MCNC: 11 MG/DL (ref 8–27)
BUN/CREAT SERPL: 16 (ref 12–28)
CALCIUM SERPL-MCNC: 10 MG/DL (ref 8.7–10.3)
CHLORIDE SERPL-SCNC: 102 MMOL/L (ref 96–106)
CHOLEST SERPL-MCNC: 186 MG/DL (ref 100–199)
CO2 SERPL-SCNC: 25 MMOL/L (ref 20–29)
CREAT SERPL-MCNC: 0.67 MG/DL (ref 0.57–1)
EOSINOPHIL # BLD AUTO: 0.2 X10E3/UL (ref 0–0.4)
EOSINOPHIL NFR BLD AUTO: 3 %
ERYTHROCYTE [DISTWIDTH] IN BLOOD BY AUTOMATED COUNT: 12.8 % (ref 11.7–15.4)
GLOBULIN SER CALC-MCNC: 2.4 G/DL (ref 1.5–4.5)
GLUCOSE SERPL-MCNC: 94 MG/DL (ref 65–99)
HCT VFR BLD AUTO: 47.5 % (ref 34–46.6)
HDLC SERPL-MCNC: 62 MG/DL
HGB BLD-MCNC: 15.7 G/DL (ref 11.1–15.9)
IMM GRANULOCYTES # BLD AUTO: 0 X10E3/UL (ref 0–0.1)
IMM GRANULOCYTES NFR BLD AUTO: 0 %
LDLC SERPL CALC-MCNC: 110 MG/DL (ref 0–99)
LYMPHOCYTES # BLD AUTO: 1.5 X10E3/UL (ref 0.7–3.1)
LYMPHOCYTES NFR BLD AUTO: 28 %
MCH RBC QN AUTO: 31 PG (ref 26.6–33)
MCHC RBC AUTO-ENTMCNC: 33.1 G/DL (ref 31.5–35.7)
MCV RBC AUTO: 94 FL (ref 79–97)
MONOCYTES # BLD AUTO: 0.6 X10E3/UL (ref 0.1–0.9)
MONOCYTES NFR BLD AUTO: 11 %
NEUTROPHILS # BLD AUTO: 3.2 X10E3/UL (ref 1.4–7)
NEUTROPHILS NFR BLD AUTO: 57 %
PLATELET # BLD AUTO: 284 X10E3/UL (ref 150–450)
POTASSIUM SERPL-SCNC: 4.7 MMOL/L (ref 3.5–5.2)
PROT SERPL-MCNC: 6.8 G/DL (ref 6–8.5)
RBC # BLD AUTO: 5.07 X10E6/UL (ref 3.77–5.28)
SODIUM SERPL-SCNC: 141 MMOL/L (ref 134–144)
TRIGL SERPL-MCNC: 75 MG/DL (ref 0–149)
TSH SERPL DL<=0.005 MIU/L-ACNC: 3.15 UIU/ML (ref 0.45–4.5)
VLDLC SERPL CALC-MCNC: 14 MG/DL (ref 5–40)
WBC # BLD AUTO: 5.5 X10E3/UL (ref 3.4–10.8)

## 2021-11-11 NOTE — PROGRESS NOTES
Injection  Injection performed in FLU VACCINE RIGHT DELTOID Jessica Toure MA. Patient tolerated the procedure well without complications.  11/11/21   Jessica Toure MA

## 2021-11-19 ENCOUNTER — TELEPHONE (OUTPATIENT)
Dept: FAMILY MEDICINE CLINIC | Facility: CLINIC | Age: 76
End: 2021-11-19

## 2021-11-19 NOTE — TELEPHONE ENCOUNTER
Caller: Brandee Cabello    Relationship: Self    Best call back number: [phone]    What medication are you requesting:     What are your current symptoms: UTI    How long have you been experiencing symptoms: ABOUT A WEEK    Have you had these symptoms before:    [x] Yes  [] No    Have you been treated for these symptoms before:   [x] Yes  [] No    If a prescription is needed, what is your preferred pharmacy and phone number: [id number] - [place], [place] - [address] [address] [address] [address] - [phone]  - [phone]      Additional notes: PATIENT STATES SHE HAS A UTI. PLEASE CALL BEFORE NOON

## 2022-10-22 DIAGNOSIS — E78.00 PURE HYPERCHOLESTEROLEMIA: Chronic | ICD-10-CM

## 2022-10-22 DIAGNOSIS — I10 ESSENTIAL HYPERTENSION: Chronic | ICD-10-CM

## 2022-10-24 ENCOUNTER — TELEPHONE (OUTPATIENT)
Dept: FAMILY MEDICINE CLINIC | Facility: CLINIC | Age: 77
End: 2022-10-24

## 2022-10-24 RX ORDER — LATANOPROST 50 UG/ML
SOLUTION/ DROPS OPHTHALMIC
COMMUNITY
Start: 2022-10-18

## 2022-10-24 RX ORDER — ATORVASTATIN CALCIUM 20 MG/1
TABLET, FILM COATED ORAL
Qty: 90 TABLET | Refills: 3 | OUTPATIENT
Start: 2022-10-24

## 2022-10-24 RX ORDER — AMLODIPINE BESYLATE 10 MG/1
TABLET ORAL
Qty: 90 TABLET | Refills: 3 | OUTPATIENT
Start: 2022-10-24

## 2022-10-24 NOTE — PROGRESS NOTES
The ABCs of the Annual Wellness Visit  Subsequent Medicare Wellness Visit    Chief Complaint   Patient presents with   • Hypertension   • Hyperlipidemia   • MEDICARE WELLNESS     DUE       Subjective    History of Present Illness:  Brandee Cabello is a 77 y.o. female who presents for a Subsequent Medicare Wellness Visit.    The following portions of the patient's history were reviewed and   updated as appropriate: allergies, current medications, past family history, past medical history, past social history, past surgical history and problem list.    Compared to one year ago, the patient feels her physical   health is the same.    Compared to one year ago, the patient feels her mental   health is the same.    Recent Hospitalizations:  She was not admitted to the hospital during the last year.       Current Medical Providers:  Patient Care Team:  Minor Blanc MD as PCP - General (Family Medicine)    Outpatient Medications Prior to Visit   Medication Sig Dispense Refill   • latanoprost (XALATAN) 0.005 % ophthalmic solution      • LUMIGAN 0.01 % ophthalmic drops      • meloxicam (MOBIC) 7.5 MG tablet Take 1 tablet by mouth Daily. For neck pain and headache. 15 tablet 0   • phenazopyridine (Pyridium) 200 MG tablet Take 1 tablet by mouth 3 (Three) Times a Day As Needed for Bladder Spasms. 6 tablet 0   • amLODIPine (NORVASC) 10 MG tablet Take 1 tablet by mouth Daily. 90 tablet 3   • atorvastatin (LIPITOR) 20 MG tablet Take 1 tablet by mouth Daily. For cholesterol 90 tablet 3     No facility-administered medications prior to visit.       No opioid medication identified on active medication list. I have reviewed chart for other potential  high risk medication/s and harmful drug interactions in the elderly.          Aspirin is not on active medication list.  Aspirin use is not indicated based on review of current medical condition/s. Risk of harm outweighs potential benefits.  .    Patient Active Problem List   Diagnosis  "  • Hyperlipidemia   • Hypertension   • Reflux esophagitis   • Vitamin D deficiency     Advance Care Planning  Advance Directive is not on file.  ACP discussion was held with the patient during this visit. Patient does not have an advance directive, declines further assistance.    Review of Systems   Constitutional: Negative for chills and fever.   HENT: Negative for congestion and sinus pressure.    Eyes: Negative for visual disturbance.   Respiratory: Negative for cough and shortness of breath.    Cardiovascular: Negative for chest pain.   Gastrointestinal: Negative for abdominal pain.   Genitourinary: Negative for dysuria.   Skin: Negative for rash.   Hematological: Negative for adenopathy.   Psychiatric/Behavioral: Negative for dysphoric mood.        Objective    Vitals:    10/24/22 1658   BP: 134/80   Pulse: 68   Resp: 16   Temp: 97.8 °F (36.6 °C)   TempSrc: Oral   Weight: 70.8 kg (156 lb)   Height: 153.7 cm (60.5\")   PainSc: 0-No pain     Estimated body mass index is 29.97 kg/m² as calculated from the following:    Height as of this encounter: 153.7 cm (60.5\").    Weight as of this encounter: 70.8 kg (156 lb).    BMI is >= 25 and <30. (Overweight) The following options were offered after discussion;: exercise counseling/recommendations and nutrition counseling/recommendations      Does the patient have evidence of cognitive impairment? No    Physical Exam  Constitutional:       General: She is not in acute distress.     Appearance: She is well-developed.   Cardiovascular:      Rate and Rhythm: Normal rate and regular rhythm.   Pulmonary:      Effort: Pulmonary effort is normal.      Breath sounds: Normal breath sounds.   Neurological:      Mental Status: She is alert and oriented to person, place, and time.   Psychiatric:         Behavior: Behavior normal.         Thought Content: Thought content normal.                 HEALTH RISK ASSESSMENT    Smoking Status:  Social History     Tobacco Use   Smoking Status " Never   Smokeless Tobacco Never     Alcohol Consumption:  Social History     Substance and Sexual Activity   Alcohol Use Yes    Comment: SOCIAL      Fall Risk Screen:    [facility] Fall Risk Assessment has not been completed.    Depression Screening:  PHQ-2/PHQ-9 Depression Screening 10/25/2022   Retired PHQ-9 Total Score -   Retired Total Score -   Little Interest or Pleasure in Doing Things 0-->not at all   Feeling Down, Depressed or Hopeless 0-->not at all   PHQ-9: Brief Depression Severity Measure Score 0       Health Habits and Functional and Cognitive Screening:  Functional & Cognitive Status 10/25/2022   Do you have difficulty preparing food and eating? No   Do you have difficulty bathing yourself, getting dressed or grooming yourself? No   Do you have difficulty using the toilet? No   Do you have difficulty moving around from place to place? No   Do you have trouble with steps or getting out of a bed or a chair? No   Current Diet Well Balanced Diet   Dental Exam Up to date   Eye Exam Up to date   Exercise (times per week) 3 times per week   Current Exercises Include Walking   Current Exercise Activities Include -   Do you need help using the phone?  No   Are you deaf or do you have serious difficulty hearing?  No   Do you need help with transportation? No   Do you need help shopping? No   Do you need help preparing meals?  No   Do you need help with housework?  No   Do you need help with laundry? No   Do you need help taking your medications? No   Do you need help managing money? No   Do you ever drive or ride in a car without wearing a seat belt? No   Have you felt unusual stress, anger or loneliness in the last month? No   Who do you live with? Spouse   If you need help, do you have trouble finding someone available to you? Yes   Have you been bothered in the last four weeks by sexual problems? No   Do you have difficulty concentrating, remembering or making decisions? No       Age-appropriate Screening  Schedule:  Refer to the list below for future screening recommendations based on patient's age, sex and/or medical conditions. Orders for these recommended tests are listed in the plan section. The patient has been provided with a written plan.    Health Maintenance   Topic Date Due   • DXA SCAN  Never done   • INFLUENZA VACCINE  08/01/2022   • ZOSTER VACCINE (2 of 2) 11/17/2022 (Originally 10/28/2016)   • LIPID PANEL  11/09/2022   • TDAP/TD VACCINES (2 - Td or Tdap) 09/02/2026   • MAMMOGRAM  Discontinued              Assessment & Plan   CMS Preventative Services Quick Reference  Risk Factors Identified During Encounter  None Identified  The above risks/problems have been discussed with the patient.  Follow up actions/plans if indicated are seen below in the Assessment/Plan Section.  Pertinent information has been shared with the patient in the After Visit Summary.    Diagnoses and all orders for this visit:    1. Medicare annual wellness visit, subsequent (Primary)    2. Essential hypertension  -     amLODIPine (NORVASC) 10 MG tablet; Take 1 tablet by mouth Daily.  Dispense: 90 tablet; Refill: 3  -     Comprehensive metabolic panel  -     Lipid panel  -     CBC and Differential  -     TSH    3. Pure hypercholesterolemia  -     atorvastatin (LIPITOR) 20 MG tablet; Take 1 tablet by mouth Daily. For cholesterol  Dispense: 90 tablet; Refill: 3  -     Lipid panel    4. Encounter for screening mammogram for breast cancer  -     Mammo Screening Bilateral With CAD; Future    5. Immunization due  -     Fluzone High-Dose 65+yrs        Follow Up:   Return in about 1 year (around 10/25/2023) for Recheck.     An After Visit Summary and PPPS were made available to the patient.          I spent 15 minutes caring for Brandee on this date of service. This time includes time spent by me in the following activities:preparing for the visit and reviewing tests

## 2022-10-25 ENCOUNTER — OFFICE VISIT (OUTPATIENT)
Dept: FAMILY MEDICINE CLINIC | Facility: CLINIC | Age: 77
End: 2022-10-25

## 2022-10-25 VITALS
HEART RATE: 68 BPM | BODY MASS INDEX: 29.45 KG/M2 | RESPIRATION RATE: 16 BRPM | SYSTOLIC BLOOD PRESSURE: 134 MMHG | HEIGHT: 61 IN | DIASTOLIC BLOOD PRESSURE: 80 MMHG | WEIGHT: 156 LBS | TEMPERATURE: 97.8 F

## 2022-10-25 DIAGNOSIS — Z12.31 ENCOUNTER FOR SCREENING MAMMOGRAM FOR BREAST CANCER: ICD-10-CM

## 2022-10-25 DIAGNOSIS — I10 ESSENTIAL HYPERTENSION: Chronic | ICD-10-CM

## 2022-10-25 DIAGNOSIS — Z00.00 MEDICARE ANNUAL WELLNESS VISIT, SUBSEQUENT: Primary | ICD-10-CM

## 2022-10-25 DIAGNOSIS — Z23 IMMUNIZATION DUE: ICD-10-CM

## 2022-10-25 DIAGNOSIS — E78.00 PURE HYPERCHOLESTEROLEMIA: Chronic | ICD-10-CM

## 2022-10-25 PROCEDURE — 1159F MED LIST DOCD IN RCRD: CPT | Performed by: FAMILY MEDICINE

## 2022-10-25 PROCEDURE — 90662 IIV NO PRSV INCREASED AG IM: CPT | Performed by: FAMILY MEDICINE

## 2022-10-25 PROCEDURE — 1170F FXNL STATUS ASSESSED: CPT | Performed by: FAMILY MEDICINE

## 2022-10-25 PROCEDURE — G0439 PPPS, SUBSEQ VISIT: HCPCS | Performed by: FAMILY MEDICINE

## 2022-10-25 PROCEDURE — G0008 ADMIN INFLUENZA VIRUS VAC: HCPCS | Performed by: FAMILY MEDICINE

## 2022-10-25 PROCEDURE — 1126F AMNT PAIN NOTED NONE PRSNT: CPT | Performed by: FAMILY MEDICINE

## 2022-10-25 RX ORDER — AMLODIPINE BESYLATE 10 MG/1
10 TABLET ORAL DAILY
Qty: 90 TABLET | Refills: 3 | Status: SHIPPED | OUTPATIENT
Start: 2022-10-25

## 2022-10-25 RX ORDER — ATORVASTATIN CALCIUM 20 MG/1
20 TABLET, FILM COATED ORAL DAILY
Qty: 90 TABLET | Refills: 3 | Status: SHIPPED | OUTPATIENT
Start: 2022-10-25

## 2022-10-25 NOTE — PROGRESS NOTES
Injection  HD FLU VACCINE Injection performed in RIGHT DELTOID  by Jessica Toure MA. Patient tolerated the procedure well without complications.  10/25/22   Jessica Toure MA

## 2022-10-25 NOTE — PATIENT INSTRUCTIONS
Medicare Wellness  Personal Prevention Plan of Service     Date of Office Visit:    Encounter Provider:  Minor Blanc MD  Place of Service:  [facility]  Patient Name: Brandee Cabello  :  1945    As part of the Medicare Wellness portion of your visit today, we are providing you with this personalized preventive plan of services (PPPS). This plan is based upon recommendations of the [place] Preventive Services Task Force (USPSTF) and the Advisory Committee on Immunization Practices (ACIP).    This lists the preventive care services that should be considered, and provides dates of when you are due. Items listed as completed are up-to-date and do not require any further intervention.    Health Maintenance   Topic Date Due    DXA SCAN  Never done    HEPATITIS C SCREENING  Never done    INFLUENZA VACCINE  2022    COVID-19 Vaccine (4 - Booster for Moderna series) 2022 (Originally 2021)    ZOSTER VACCINE (2 of 2) 2022 (Originally 10/28/2016)    LIPID PANEL  2022    ANNUAL WELLNESS VISIT  10/25/2023    TDAP/TD VACCINES (2 - Td or Tdap) 2026    Pneumococcal Vaccine 65+  Completed    MAMMOGRAM  Discontinued    COLORECTAL CANCER SCREENING  Discontinued       No orders of the defined types were placed in this encounter.      Return in about 1 year (around 10/25/2023) for Recheck.

## 2022-10-26 LAB
ALBUMIN SERPL-MCNC: 4.9 G/DL (ref 3.5–5.2)
ALBUMIN/GLOB SERPL: 2.2 G/DL
ALP SERPL-CCNC: 99 U/L (ref 39–117)
ALT SERPL-CCNC: 15 U/L (ref 1–33)
AST SERPL-CCNC: 17 U/L (ref 1–32)
BASOPHILS # BLD AUTO: 0.04 10*3/MM3 (ref 0–0.2)
BASOPHILS NFR BLD AUTO: 0.8 % (ref 0–1.5)
BILIRUB SERPL-MCNC: 0.6 MG/DL (ref 0–1.2)
BUN SERPL-MCNC: 10 MG/DL (ref 8–23)
BUN/CREAT SERPL: 14.5 (ref 7–25)
CALCIUM SERPL-MCNC: 10.1 MG/DL (ref 8.6–10.5)
CHLORIDE SERPL-SCNC: 103 MMOL/L (ref 98–107)
CHOLEST SERPL-MCNC: 185 MG/DL (ref 0–200)
CO2 SERPL-SCNC: 31 MMOL/L (ref 22–29)
CREAT SERPL-MCNC: 0.69 MG/DL (ref 0.57–1)
EGFRCR SERPLBLD CKD-EPI 2021: 88.4 ML/MIN/1.73
EOSINOPHIL # BLD AUTO: 0.1 10*3/MM3 (ref 0–0.4)
EOSINOPHIL NFR BLD AUTO: 2 % (ref 0.3–6.2)
ERYTHROCYTE [DISTWIDTH] IN BLOOD BY AUTOMATED COUNT: 12.7 % (ref 12.3–15.4)
GLOBULIN SER CALC-MCNC: 2.2 GM/DL
GLUCOSE SERPL-MCNC: 93 MG/DL (ref 65–99)
HCT VFR BLD AUTO: 47.2 % (ref 34–46.6)
HDLC SERPL-MCNC: 61 MG/DL (ref 40–60)
HGB BLD-MCNC: 15.1 G/DL (ref 12–15.9)
IMM GRANULOCYTES # BLD AUTO: 0.02 10*3/MM3 (ref 0–0.05)
IMM GRANULOCYTES NFR BLD AUTO: 0.4 % (ref 0–0.5)
LDLC SERPL CALC-MCNC: 107 MG/DL (ref 0–100)
LYMPHOCYTES # BLD AUTO: 1.46 10*3/MM3 (ref 0.7–3.1)
LYMPHOCYTES NFR BLD AUTO: 29.9 % (ref 19.6–45.3)
MCH RBC QN AUTO: 30.9 PG (ref 26.6–33)
MCHC RBC AUTO-ENTMCNC: 32 G/DL (ref 31.5–35.7)
MCV RBC AUTO: 96.7 FL (ref 79–97)
MONOCYTES # BLD AUTO: 0.47 10*3/MM3 (ref 0.1–0.9)
MONOCYTES NFR BLD AUTO: 9.6 % (ref 5–12)
NEUTROPHILS # BLD AUTO: 2.79 10*3/MM3 (ref 1.7–7)
NEUTROPHILS NFR BLD AUTO: 57.3 % (ref 42.7–76)
NRBC BLD AUTO-RTO: 0 /100 WBC (ref 0–0.2)
PLATELET # BLD AUTO: 270 10*3/MM3 (ref 140–450)
POTASSIUM SERPL-SCNC: 4.8 MMOL/L (ref 3.5–5.2)
PROT SERPL-MCNC: 7.1 G/DL (ref 6–8.5)
RBC # BLD AUTO: 4.88 10*6/MM3 (ref 3.77–5.28)
SODIUM SERPL-SCNC: 142 MMOL/L (ref 136–145)
TRIGL SERPL-MCNC: 97 MG/DL (ref 0–150)
TSH SERPL DL<=0.005 MIU/L-ACNC: 3.05 UIU/ML (ref 0.27–4.2)
VLDLC SERPL CALC-MCNC: 17 MG/DL (ref 5–40)
WBC # BLD AUTO: 4.88 10*3/MM3 (ref 3.4–10.8)

## 2022-10-27 ENCOUNTER — TRANSCRIBE ORDERS (OUTPATIENT)
Dept: ADMINISTRATIVE | Facility: HOSPITAL | Age: 77
End: 2022-10-27

## 2022-10-27 DIAGNOSIS — Z12.31 VISIT FOR SCREENING MAMMOGRAM: Primary | ICD-10-CM

## 2022-11-16 ENCOUNTER — APPOINTMENT (OUTPATIENT)
Dept: MAMMOGRAPHY | Facility: HOSPITAL | Age: 77
End: 2022-11-16

## 2023-03-14 RX ORDER — AZITHROMYCIN 250 MG/1
TABLET, FILM COATED ORAL
Qty: 6 TABLET | Refills: 0 | Status: SHIPPED | OUTPATIENT
Start: 2023-03-14

## 2023-03-14 RX ORDER — MONTELUKAST SODIUM 10 MG/1
10 TABLET ORAL NIGHTLY
Qty: 14 TABLET | Refills: 0 | Status: SHIPPED | OUTPATIENT
Start: 2023-03-14 | End: 2023-03-28

## 2023-03-16 ENCOUNTER — TELEPHONE (OUTPATIENT)
Dept: FAMILY MEDICINE CLINIC | Facility: CLINIC | Age: 78
End: 2023-03-16

## 2023-03-16 NOTE — TELEPHONE ENCOUNTER
Caller: Brandee Cabello    Relationship to patient: Self    Best call back number: [phone]    Patient is needing: PATIENT WANTED TO TELL DR CHOWDARY THAT SHE TESTED POSITIVE FOR COVID TODAY.  SHE STATES SO FAR, SHE ONLY HAS A HEADACHE AND NECK PAIN.    [id number] [place] - [address] [address] [place] [address] [address] - [phone] [facility] [phone]   [phone]    PLEASE ADVISE.

## 2023-11-03 DIAGNOSIS — E78.00 PURE HYPERCHOLESTEROLEMIA: Chronic | ICD-10-CM

## 2023-11-03 RX ORDER — ATORVASTATIN CALCIUM 20 MG/1
20 TABLET, FILM COATED ORAL DAILY
Qty: 90 TABLET | Refills: 0 | OUTPATIENT
Start: 2023-11-03

## 2023-11-06 RX ORDER — DORZOLAMIDE HYDROCHLORIDE AND TIMOLOL MALEATE 20; 5 MG/ML; MG/ML
1 SOLUTION/ DROPS OPHTHALMIC
COMMUNITY
Start: 2023-09-28

## 2023-11-06 NOTE — PROGRESS NOTES
The ABCs of the Annual Wellness Visit  Subsequent Medicare Wellness Visit    Subjective    Brandee Cabello is a 78 y.o. female who presents for a Subsequent Medicare Wellness Visit.    The following portions of the patient's history were reviewed and   updated as appropriate: allergies, current medications, past family history, past medical history, past social history, past surgical history, and problem list.    Compared to one year ago, the patient feels her physical   health is the same.    Compared to one year ago, the patient feels her mental   health is the same.    Recent Hospitalizations:  She was not admitted to the hospital during the last year.       Current Medical Providers:  Patient Care Team:  Minor Blanc MD as PCP - General (Family Medicine)    Outpatient Medications Prior to Visit   Medication Sig Dispense Refill    dorzolamide-timolol (COSOPT) 2-0.5 % ophthalmic solution Administer 1 drop to both eyes.      latanoprost (XALATAN) 0.005 % ophthalmic solution       LUMIGAN 0.01 % ophthalmic drops       amLODIPine (NORVASC) 10 MG tablet Take 1 tablet by mouth Daily. 90 tablet 3    atorvastatin (LIPITOR) 20 MG tablet Take 1 tablet by mouth Daily. For cholesterol 90 tablet 3     No facility-administered medications prior to visit.       No opioid medication identified on active medication list. I have reviewed chart for other potential  high risk medication/s and harmful drug interactions in the elderly.        Aspirin is not on active medication list.  Aspirin use is not indicated based on review of current medical condition/s. Risk of harm outweighs potential benefits.  .    Patient Active Problem List   Diagnosis    Hyperlipidemia    Hypertension    Reflux esophagitis    Vitamin D deficiency     Advance Care Planning   Advance Care Planning     Advance Directive is not on file.  ACP discussion was held with the patient during this visit. Patient does not have an advance directive, information  "provided.     Objective    Vitals:    23 1359   BP: 130/71   Pulse: 75   Resp: 16   Temp: 97.5 °F (36.4 °C)   TempSrc: Oral   SpO2: 98%   Weight: 72.1 kg (159 lb)   Height: 153.7 cm (60.5\")   PainSc: 0-No pain     Estimated body mass index is 30.54 kg/m² as calculated from the following:    Height as of this encounter: 153.7 cm (60.5\").    Weight as of this encounter: 72.1 kg (159 lb).    BMI is >= 25 and <30. (Overweight) The following options were offered after discussion;: exercise counseling/recommendations and nutrition counseling/recommendations      Does the patient have evidence of cognitive impairment? No          HEALTH RISK ASSESSMENT    Smoking Status:  Social History     Tobacco Use   Smoking Status Never   Smokeless Tobacco Never     Alcohol Consumption:  Social History     Substance and Sexual Activity   Alcohol Use Yes    Comment: SOCIAL      Fall Risk Screen:    [facility].    Depression Screenin/7/2023    10:52 AM   PHQ-2/PHQ-9 Depression Screening   Little Interest or Pleasure in Doing Things 0-->not at all   Feeling Down, Depressed or Hopeless 0-->not at all   PHQ-9: Brief Depression Severity Measure Score 0       Health Habits and Functional and Cognitive Screenin/7/2023    10:00 AM   Functional & Cognitive Status   Do you have difficulty preparing food and eating? No   Do you have difficulty bathing yourself, getting dressed or grooming yourself? No   Do you have difficulty using the toilet? No   Do you have difficulty moving around from place to place? No   Do you have trouble with steps or getting out of a bed or a chair? No   Current Diet Well Balanced Diet   Dental Exam Up to date   Eye Exam Up to date   Exercise (times per week) 4 times per week   Current Exercises Include Swimming;Walking   Do you need help using the phone?  No   Are you deaf or do you have serious difficulty hearing?  No   Do you need help to go to places " out of walking distance? No   Do you need help shopping? No   Do you need help preparing meals?  No   Do you need help with housework?  No   Do you need help with laundry? No   Do you need help taking your medications? No   Do you need help managing money? No   Do you ever drive or ride in a car without wearing a seat belt? No   Have you felt unusual stress, anger or loneliness in the last month? No   Who do you live with? Spouse   If you need help, do you have trouble finding someone available to you? Yes   Have you been bothered in the last four weeks by sexual problems? No   Do you have difficulty concentrating, remembering or making decisions? No       Age-appropriate Screening Schedule:  Refer to the list below for future screening recommendations based on patient's age, sex and/or medical conditions. Orders for these recommended tests are listed in the plan section. The patient has been provided with a written plan.    Health Maintenance   Topic Date Due    DXA SCAN  Never done    INFLUENZA VACCINE  08/01/2023    LIPID PANEL  10/25/2023    COVID-19 Vaccine (4 - 2023-24 season) 01/27/2024 (Originally 9/1/2023)    ZOSTER VACCINE (2 of 2) 11/07/2024 (Originally 10/28/2016)    ANNUAL WELLNESS VISIT  11/07/2024    BMI FOLLOWUP  11/07/2024    TDAP/TD VACCINES (2 - Td or Tdap) 09/02/2026    Pneumococcal Vaccine 65+  Completed    COLORECTAL CANCER SCREENING  Discontinued                  CMS Preventative Services Quick Reference  Risk Factors Identified During Encounter  None Identified  The above risks/problems have been discussed with the patient.  Pertinent information has been shared with the patient in the After Visit Summary.  An After Visit Summary and PPPS were made available to the patient.    Follow Up:   Next Medicare Wellness visit to be scheduled in 1 year.       Additional E&M Note during same encounter follows:  Patient has multiple medical problems which are significant and separately identifiable that  "require additional work above and beyond the Medicare Wellness Visit.      Chief Complaint  Hypertension, Hyperlipidemia, and MEDICARE WELLNESS (DUE)    Subjective        HPI  Brandee Cabello is also being seen today for Medication Management.    Pt doing well on the medication(s) w/o SEs, and is due refill today.      Review of Systems   Constitutional:  Negative for chills and fever.   HENT:  Negative for congestion and sinus pressure.    Eyes:  Negative for visual disturbance.   Respiratory:  Negative for cough and shortness of breath.    Cardiovascular:  Negative for chest pain.   Gastrointestinal:  Negative for abdominal pain.   Genitourinary:  Negative for dysuria.   Skin:  Negative for rash.   Hematological:  Negative for adenopathy.   Psychiatric/Behavioral:  Negative for dysphoric mood.        Objective   Vital Signs:  /71   Pulse 75   Temp 97.5 °F (36.4 °C) (Oral)   Resp 16   Ht 153.7 cm (60.5\")   Wt 72.1 kg (159 lb)   SpO2 98%   BMI 30.54 kg/m²     Physical Exam  Constitutional:       General: She is not in acute distress.     Appearance: She is well-developed.   Cardiovascular:      Rate and Rhythm: Normal rate and regular rhythm.   Pulmonary:      Effort: Pulmonary effort is normal.      Breath sounds: Normal breath sounds.   Neurological:      Mental Status: She is alert and oriented to person, place, and time.   Psychiatric:         Behavior: Behavior normal.         Thought Content: Thought content normal.          The following data was reviewed by: Minor Blanc MD on 11/07/2023:               Assessment and Plan   Diagnoses and all orders for this visit:    1. Medicare annual wellness visit, subsequent (Primary)    2. Essential hypertension  -     amLODIPine (NORVASC) 10 MG tablet; Take 1 tablet by mouth Daily.  Dispense: 90 tablet; Refill: 3  -     Comprehensive metabolic panel  -     Lipid panel  -     CBC and Differential  -     TSH    3. Pure hypercholesterolemia  -     " atorvastatin (LIPITOR) 20 MG tablet; Take 1 tablet by mouth Daily. For cholesterol  Dispense: 90 tablet; Refill: 3  -     Lipid panel           I spent 15 minutes caring for Brandee on this date of service. This time includes time spent by me in the following activities:preparing for the visit, performing a medically appropriate examination and/or evaluation , ordering medications, tests, or procedures, and documenting information in the medical record  Follow Up   No follow-ups on file.  Patient was given instructions and counseling regarding her condition or for health maintenance advice. Please see specific information pulled into the AVS if appropriate.

## 2023-11-07 ENCOUNTER — OFFICE VISIT (OUTPATIENT)
Dept: FAMILY MEDICINE CLINIC | Facility: CLINIC | Age: 78
End: 2023-11-07
Payer: MEDICARE

## 2023-11-07 VITALS
WEIGHT: 159 LBS | RESPIRATION RATE: 16 BRPM | HEART RATE: 75 BPM | TEMPERATURE: 97.5 F | DIASTOLIC BLOOD PRESSURE: 71 MMHG | BODY MASS INDEX: 30.02 KG/M2 | HEIGHT: 61 IN | SYSTOLIC BLOOD PRESSURE: 130 MMHG | OXYGEN SATURATION: 98 %

## 2023-11-07 DIAGNOSIS — E78.00 PURE HYPERCHOLESTEROLEMIA: Chronic | ICD-10-CM

## 2023-11-07 DIAGNOSIS — Z00.00 MEDICARE ANNUAL WELLNESS VISIT, SUBSEQUENT: Primary | ICD-10-CM

## 2023-11-07 DIAGNOSIS — I10 ESSENTIAL HYPERTENSION: Chronic | ICD-10-CM

## 2023-11-07 RX ORDER — AMLODIPINE BESYLATE 10 MG/1
10 TABLET ORAL DAILY
Qty: 90 TABLET | Refills: 3 | Status: SHIPPED | OUTPATIENT
Start: 2023-11-07

## 2023-11-07 RX ORDER — ATORVASTATIN CALCIUM 20 MG/1
20 TABLET, FILM COATED ORAL DAILY
Qty: 90 TABLET | Refills: 3 | Status: SHIPPED | OUTPATIENT
Start: 2023-11-07

## 2023-11-07 NOTE — PATIENT INSTRUCTIONS
Medicare Wellness  Personal Prevention Plan of Service     Date of Office Visit:    Encounter Provider:  Minor Blanc MD  Place of Service:  [facility]ARE  Patient Name: Brandee Cabello  :  1945    As part of the Medicare Wellness portion of your visit today, we are providing you with this personalized preventive plan of services (PPPS). This plan is based upon recommendations of the [place] Preventive Services Task Force (USPSTF) and the Advisory Committee on Immunization Practices (ACIP).    This lists the preventive care services that should be considered, and provides dates of when you are due. Items listed as completed are up-to-date and do not require any further intervention.    Health Maintenance   Topic Date Due    DXA SCAN  Never done    INFLUENZA VACCINE  2023    LIPID PANEL  10/25/2023    COVID-19 Vaccine ( season) 2024 (Originally 2023)    ZOSTER VACCINE (2 of 2) 2024 (Originally 10/28/2016)    ANNUAL WELLNESS VISIT  2024    BMI FOLLOWUP  2024    TDAP/TD VACCINES (2 - Td or Tdap) 2026    Pneumococcal Vaccine 65+  Completed    COLORECTAL CANCER SCREENING  Discontinued       Orders Placed This Encounter   Procedures    Comprehensive metabolic panel     Order Specific Question:   Release to patient     Answer:   Routine Release [[id number]]    Lipid panel     Order Specific Question:   Release to patient     Answer:   Routine Release [[id number]]    TSH     Order Specific Question:   Release to patient     Answer:   Routine Release [[id number]]    CBC and Differential     Order Specific Question:   Manual Differential     Answer:   Yes     Order Specific Question:   Release to patient     Answer:   Routine Release [[id number]]       Return in about 1 year (around 2024) for Recheck.

## 2024-01-12 LAB
ALBUMIN SERPL-MCNC: 4.1 G/DL (ref 3.8–4.8)
ALBUMIN/GLOB SERPL: 1.6 {RATIO} (ref 1.2–2.2)
ALP SERPL-CCNC: 83 IU/L (ref 44–121)
ALT SERPL-CCNC: 17 IU/L (ref 0–32)
AST SERPL-CCNC: 31 IU/L (ref 0–40)
BASOPHILS # BLD AUTO: 0 X10E3/UL (ref 0–0.2)
BASOPHILS NFR BLD AUTO: 1 %
BILIRUB SERPL-MCNC: 0.5 MG/DL (ref 0–1.2)
BUN SERPL-MCNC: 16 MG/DL (ref 8–27)
BUN/CREAT SERPL: 21 (ref 12–28)
CALCIUM SERPL-MCNC: 9.3 MG/DL (ref 8.7–10.3)
CHLORIDE SERPL-SCNC: 101 MMOL/L (ref 96–106)
CHOLEST SERPL-MCNC: 179 MG/DL (ref 100–199)
CO2 SERPL-SCNC: 23 MMOL/L (ref 20–29)
CREAT SERPL-MCNC: 0.75 MG/DL (ref 0.57–1)
EGFRCR SERPLBLD CKD-EPI 2021: 80 ML/MIN/1.73
EOSINOPHIL # BLD AUTO: 0.1 X10E3/UL (ref 0–0.4)
EOSINOPHIL NFR BLD AUTO: 2 %
ERYTHROCYTE [DISTWIDTH] IN BLOOD BY AUTOMATED COUNT: 12.8 % (ref 11.7–15.4)
GLOBULIN SER CALC-MCNC: 2.6 G/DL (ref 1.5–4.5)
GLUCOSE SERPL-MCNC: 103 MG/DL (ref 70–99)
HCT VFR BLD AUTO: 45.9 % (ref 34–46.6)
HDLC SERPL-MCNC: 43 MG/DL
HGB BLD-MCNC: 15.4 G/DL (ref 11.1–15.9)
IMM GRANULOCYTES # BLD AUTO: 0 X10E3/UL (ref 0–0.1)
IMM GRANULOCYTES NFR BLD AUTO: 0 %
LDLC SERPL CALC-MCNC: 115 MG/DL (ref 0–99)
LYMPHOCYTES # BLD AUTO: 1.2 X10E3/UL (ref 0.7–3.1)
LYMPHOCYTES NFR BLD AUTO: 30 %
MCH RBC QN AUTO: 31.8 PG (ref 26.6–33)
MCHC RBC AUTO-ENTMCNC: 33.6 G/DL (ref 31.5–35.7)
MCV RBC AUTO: 95 FL (ref 79–97)
MONOCYTES # BLD AUTO: 0.3 X10E3/UL (ref 0.1–0.9)
MONOCYTES NFR BLD AUTO: 9 %
NEUTROPHILS # BLD AUTO: 2.4 X10E3/UL (ref 1.4–7)
NEUTROPHILS NFR BLD AUTO: 58 %
PLATELET # BLD AUTO: 261 X10E3/UL (ref 150–450)
POTASSIUM SERPL-SCNC: ABNORMAL MMOL/L
PROT SERPL-MCNC: 6.7 G/DL (ref 6–8.5)
RBC # BLD AUTO: 4.84 X10E6/UL (ref 3.77–5.28)
SODIUM SERPL-SCNC: 142 MMOL/L (ref 134–144)
TRIGL SERPL-MCNC: 114 MG/DL (ref 0–149)
TSH SERPL DL<=0.005 MIU/L-ACNC: 2.51 UIU/ML (ref 0.45–4.5)
VLDLC SERPL CALC-MCNC: 21 MG/DL (ref 5–40)
WBC # BLD AUTO: 4 X10E3/UL (ref 3.4–10.8)

## 2024-08-17 DIAGNOSIS — E78.00 PURE HYPERCHOLESTEROLEMIA: Chronic | ICD-10-CM

## 2024-08-17 RX ORDER — ATORVASTATIN CALCIUM 20 MG/1
20 TABLET, FILM COATED ORAL DAILY
Qty: 90 TABLET | Refills: 0 | Status: SHIPPED | OUTPATIENT
Start: 2024-08-17

## 2024-09-16 ENCOUNTER — TELEPHONE (OUTPATIENT)
Dept: FAMILY MEDICINE CLINIC | Facility: CLINIC | Age: 79
End: 2024-09-16
Payer: MEDICARE

## 2024-09-17 RX ORDER — FLUCONAZOLE 150 MG/1
150 TABLET ORAL ONCE
Qty: 1 TABLET | Refills: 0 | Status: SHIPPED | OUTPATIENT
Start: 2024-09-17 | End: 2024-09-17

## 2024-11-14 ENCOUNTER — OFFICE VISIT (OUTPATIENT)
Dept: FAMILY MEDICINE CLINIC | Facility: CLINIC | Age: 79
End: 2024-11-14
Payer: MEDICARE

## 2024-11-14 VITALS
DIASTOLIC BLOOD PRESSURE: 82 MMHG | TEMPERATURE: 97.6 F | SYSTOLIC BLOOD PRESSURE: 132 MMHG | HEART RATE: 70 BPM | OXYGEN SATURATION: 95 % | RESPIRATION RATE: 16 BRPM | BODY MASS INDEX: 30.89 KG/M2 | HEIGHT: 61 IN | WEIGHT: 163.6 LBS

## 2024-11-14 DIAGNOSIS — E78.00 PURE HYPERCHOLESTEROLEMIA: Chronic | ICD-10-CM

## 2024-11-14 DIAGNOSIS — I10 ESSENTIAL HYPERTENSION: Chronic | ICD-10-CM

## 2024-11-14 DIAGNOSIS — Z00.00 ENCOUNTER FOR SUBSEQUENT ANNUAL WELLNESS VISIT (AWV) IN MEDICARE PATIENT: Primary | ICD-10-CM

## 2024-11-14 PROCEDURE — G0439 PPPS, SUBSEQ VISIT: HCPCS | Performed by: FAMILY MEDICINE

## 2024-11-14 PROCEDURE — 3079F DIAST BP 80-89 MM HG: CPT | Performed by: FAMILY MEDICINE

## 2024-11-14 PROCEDURE — 3075F SYST BP GE 130 - 139MM HG: CPT | Performed by: FAMILY MEDICINE

## 2024-11-14 PROCEDURE — 1160F RVW MEDS BY RX/DR IN RCRD: CPT | Performed by: FAMILY MEDICINE

## 2024-11-14 PROCEDURE — 1170F FXNL STATUS ASSESSED: CPT | Performed by: FAMILY MEDICINE

## 2024-11-14 PROCEDURE — 99214 OFFICE O/P EST MOD 30 MIN: CPT | Performed by: FAMILY MEDICINE

## 2024-11-14 PROCEDURE — 1126F AMNT PAIN NOTED NONE PRSNT: CPT | Performed by: FAMILY MEDICINE

## 2024-11-14 PROCEDURE — 1159F MED LIST DOCD IN RCRD: CPT | Performed by: FAMILY MEDICINE

## 2024-11-14 RX ORDER — AMLODIPINE BESYLATE 10 MG/1
10 TABLET ORAL DAILY
Qty: 90 TABLET | Refills: 3 | Status: SHIPPED | OUTPATIENT
Start: 2024-11-14

## 2024-11-14 RX ORDER — ATORVASTATIN CALCIUM 20 MG/1
20 TABLET, FILM COATED ORAL DAILY
Qty: 90 TABLET | Refills: 3 | Status: SHIPPED | OUTPATIENT
Start: 2024-11-14

## 2024-11-14 NOTE — PROGRESS NOTES
Subjective   The ABCs of the Annual Wellness Visit  Medicare Wellness Visit      Brandee Cabello is a 79 y.o. patient who presents for a Medicare Wellness Visit.    The following portions of the patient's history were reviewed and   updated as appropriate: allergies, current medications, past family history, past medical history, past social history, past surgical history, and problem list.    Compared to one year ago, the patient's physical   health is the same.  Compared to one year ago, the patient's mental   health is the same.    Recent Hospitalizations:  She was not admitted to the hospital during the last year.     Current Medical Providers:  Patient Care Team:  Minor Blanc MD as PCP - General (Family Medicine)  Jose Suarez MD as Consulting Physician (Ophthalmology)  Artem Morse MD as Consulting Physician (Orthopedic Surgery)  Mesfin Stuart MD as Consulting Physician (Orthopedic Surgery)    Outpatient Medications Prior to Visit   Medication Sig Dispense Refill    ciprofloxacin (CIPRO) 500 MG tablet Take 1 tablet by mouth 2 (Two) Times a Day. 14 tablet 0    dorzolamide-timolol (COSOPT) 2-0.5 % ophthalmic solution Administer 1 drop to both eyes.      latanoprost (XALATAN) 0.005 % ophthalmic solution       LUMIGAN 0.01 % ophthalmic drops       amLODIPine (NORVASC) 10 MG tablet Take 1 tablet by mouth Daily. 90 tablet 3    atorvastatin (LIPITOR) 20 MG tablet TAKE 1 TABLET BY MOUTH EVERY DAY FOR CHOLESTEROL 90 tablet 0     No facility-administered medications prior to visit.     No opioid medication identified on active medication list. I have reviewed chart for other potential  high risk medication/s and harmful drug interactions in the elderly.      Aspirin is not on active medication list.  Aspirin use is not indicated based on review of current medical condition/s. Risk of harm outweighs potential benefits.  .    Patient Active Problem List   Diagnosis    Hyperlipidemia    Hypertension     "Reflux esophagitis    Vitamin D deficiency     Advance Care Planning Advance Directive is not on file.  ACP discussion was held with the patient during this visit. Patient does not have an advance directive, declines further assistance.            Objective   Vitals:    24 1339   BP: 132/82   Pulse: 70   Resp: 16   Temp: 97.6 °F (36.4 °C)   TempSrc: Oral   SpO2: 95%   Weight: 74.2 kg (163 lb 9.6 oz)   Height: 153.7 cm (60.51\")   PainSc: 0-No pain       Estimated body mass index is 31.41 kg/m² as calculated from the following:    Height as of this encounter: 153.7 cm (60.51\").    Weight as of this encounter: 74.2 kg (163 lb 9.6 oz).    BMI is >= 30 and <35. (Class 1 Obesity). The following options were offered after discussion;: exercise counseling/recommendations and nutrition counseling/recommendations       Does the patient have evidence of cognitive impairment? No                                                                                                Health  Risk Assessment    Smoking Status:  Social History     Tobacco Use   Smoking Status Never   Smokeless Tobacco Never     Alcohol Consumption:  Social History     Substance and Sexual Activity   Alcohol Use Yes    Comment: SOCIAL        Fall Risk Screen  STEADI Fall Risk Assessment has not been completed.    Depression Screening   Little interest or pleasure in doing things? Not at all   Feeling down, depressed, or hopeless? Not at all   PHQ-2 Total Score 0      Health Habits and Functional and Cognitive Screenin/14/2024     1:00 PM   Functional & Cognitive Status   Do you have difficulty preparing food and eating? No   Do you have difficulty bathing yourself, getting dressed or grooming yourself? No   Do you have difficulty using the toilet? No   Do you have difficulty moving around from place to place? No   Do you have trouble with steps or getting out of a bed or a chair? No   Current Diet Well Balanced Diet   Dental Exam Up to date "   Eye Exam Up to date   Exercise (times per week) 3 times per week   Current Exercises Include Walking   Do you need help using the phone?  No   Are you deaf or do you have serious difficulty hearing?  No   Do you need help to go to places out of walking distance? No   Do you need help shopping? No   Do you need help preparing meals?  No   Do you need help with housework?  No   Do you need help with laundry? No   Do you need help taking your medications? No   Do you need help managing money? No   Do you ever drive or ride in a car without wearing a seat belt? No   Have you felt unusual stress, anger or loneliness in the last month? No   Who do you live with? Spouse   If you need help, do you have trouble finding someone available to you? No   Have you been bothered in the last four weeks by sexual problems? No   Do you have difficulty concentrating, remembering or making decisions? No           Age-appropriate Screening Schedule:  Refer to the list below for future screening recommendations based on patient's age, sex and/or medical conditions. Orders for these recommended tests are listed in the plan section. The patient has been provided with a written plan.    Health Maintenance List  Health Maintenance   Topic Date Due    DXA SCAN  Never done    RSV Vaccine - Adults (1 - 1-dose 75+ series) Never done    BMI FOLLOWUP  11/07/2024    ZOSTER VACCINE (2 of 2) 12/03/2024 (Originally 10/28/2016)    COVID-19 Vaccine (4 - 2024-25 season) 02/03/2025 (Originally 9/1/2024)    LIPID PANEL  01/11/2025    ANNUAL WELLNESS VISIT  11/14/2025    TDAP/TD VACCINES (2 - Td or Tdap) 09/02/2026    INFLUENZA VACCINE  Completed    Pneumococcal Vaccine 65+  Completed    MAMMOGRAM  Discontinued    COLORECTAL CANCER SCREENING  Discontinued                                                                                                                                                CMS Preventative Services Quick Reference  Risk Factors  "Identified During Encounter  None Identified    The above risks/problems have been discussed with the patient.  Pertinent information has been shared with the patient in the After Visit Summary.  An After Visit Summary and PPPS were made available to the patient.    Follow Up:   Next Medicare Wellness visit to be scheduled in 1 year.         Additional E&M Note during same encounter follows:  Patient has additional, significant, and separately identifiable condition(s)/problem(s) that require work above and beyond the Medicare Wellness Visit     Chief Complaint  Hypertension (Med refill due /No labs /[facility]    - BMI DUE), Hyperlipidemia, and MEDICARE WELLNESS    Subjective   HPI  Brandee is also being seen today for additional medical problem/s.    Review of Systems   Constitutional:  Negative for chills and fever.   HENT:  Negative for congestion and sinus pressure.    Eyes:  Negative for visual disturbance.   Respiratory:  Negative for cough and shortness of breath.    Cardiovascular:  Negative for chest pain.   Gastrointestinal:  Negative for abdominal pain.   Genitourinary:  Negative for dysuria.   Skin:  Negative for rash.   Hematological:  Negative for adenopathy.   Psychiatric/Behavioral:  Negative for dysphoric mood.               Objective   Vital Signs:  /82   Pulse 70   Temp 97.6 °F (36.4 °C) (Oral)   Resp 16   Ht 153.7 cm (60.51\")   Wt 74.2 kg (163 lb 9.6 oz)   SpO2 95%   BMI 31.41 kg/m²   Physical Exam  Vitals and nursing note reviewed.   Constitutional:       General: She is not in acute distress.     Appearance: She is well-developed.   Cardiovascular:      Rate and Rhythm: Normal rate and regular rhythm.   Pulmonary:      Effort: Pulmonary effort is normal.      Breath sounds: Normal breath sounds.   Neurological:      Mental Status: She is alert and oriented to person, place, and time.   Psychiatric:         Behavior: Behavior normal.         Thought Content: Thought content normal. "                       Assessment and Plan Additional age appropriate preventative wellness advice topics were discussed during today's preventative wellness exam(some topics already addressed during AWV portion of the note above):    Physical Activity: Advised cardiovascular activity 150 minutes per week as tolerated. (example brisk walk for 30 minutes, 5 days a week).     Nutrition: Discussed nutrition plan with patient. Information shared in after visit summary. Goal is for a well balanced diet to enhance overall health.           Encounter for subsequent annual wellness visit (AWV) in Medicare patient         Essential hypertension  Hypertension is stable and controlled  Continue current treatment regimen.  Blood pressure will be reassessed in 1 year.    Orders:    amLODIPine (NORVASC) 10 MG tablet; Take 1 tablet by mouth Daily.    Comprehensive metabolic panel    Lipid panel    CBC and Differential    TSH    Pure hypercholesterolemia   Lipid abnormalities are stable    Plan:  Continue same medication/s without change.      Counseled patient on lifestyle modifications to help control hyperlipidemia.     Patient Treatment Goals:   LDL goal is under 100    Followup in 1 year.    Orders:    atorvastatin (LIPITOR) 20 MG tablet; Take 1 tablet by mouth Daily.    Lipid panel          I spent 15 minutes caring for Brandee on this date of service. This time includes time spent by me in the following activities:preparing for the visit, performing a medically appropriate examination and/or evaluation , ordering medications, tests, or procedures, and documenting information in the medical record  Follow Up   No follow-ups on file.  Patient was given instructions and counseling regarding her condition or for health maintenance advice. Please see specific information pulled into the AVS if appropriate.

## 2024-11-14 NOTE — ASSESSMENT & PLAN NOTE
Lipid abnormalities are stable    Plan:  Continue same medication/s without change.      Counseled patient on lifestyle modifications to help control hyperlipidemia.     Patient Treatment Goals:   LDL goal is under 100    Followup in 1 year.    Orders:    atorvastatin (LIPITOR) 20 MG tablet; Take 1 tablet by mouth Daily.    Lipid panel

## 2024-11-14 NOTE — PATIENT INSTRUCTIONS
Medicare Wellness  Personal Prevention Plan of Service     Date of Office Visit:    Encounter Provider:  Minor Blanc MD  Place of Service:  [facility]  Patient Name: Brandee Cabello  :  1945    As part of the Medicare Wellness portion of your visit today, we are providing you with this personalized preventive plan of services (PPPS). This plan is based upon recommendations of the [place] Preventive Services Task Force (USPSTF) and the Advisory Committee on Immunization Practices (ACIP).    This lists the preventive care services that should be considered, and provides dates of when you are due. Items listed as completed are up-to-date and do not require any further intervention.    Health Maintenance   Topic Date Due    DXA SCAN  Never done    RSV Vaccine - Adults (1 - 1-dose 75+ series) Never done    BMI FOLLOWUP  2024    ZOSTER VACCINE (2 of 2) 2024 (Originally 10/28/2016)    COVID-19 Vaccine ( - -25 season) 2025 (Originally 2024)    LIPID PANEL  2025    ANNUAL WELLNESS VISIT  2025    TDAP/TD VACCINES (2 - Td or Tdap) 2026    INFLUENZA VACCINE  Completed    Pneumococcal Vaccine 65+  Completed    MAMMOGRAM  Discontinued    COLORECTAL CANCER SCREENING  Discontinued       Orders Placed This Encounter   Procedures    Comprehensive metabolic panel     Order Specific Question:   Release to patient     Answer:   Routine Release [[id number]]    Lipid panel     Order Specific Question:   Release to patient     Answer:   Routine Release [[id number]]    TSH     Order Specific Question:   Release to patient     Answer:   Routine Release [[id number]]    CBC and Differential     Order Specific Question:   Manual Differential     Answer:   Yes     Order Specific Question:   Release to patient     Answer:   Routine Release [[id number]]       Return in about 1 year (around 2025) for Recheck.

## 2025-01-08 ENCOUNTER — TELEPHONE (OUTPATIENT)
Dept: FAMILY MEDICINE CLINIC | Facility: CLINIC | Age: 80
End: 2025-01-08

## 2025-01-08 ENCOUNTER — NURSE TRIAGE (OUTPATIENT)
Dept: CALL CENTER | Facility: HOSPITAL | Age: 80
End: 2025-01-08
Payer: MEDICARE

## 2025-01-08 RX ORDER — AZITHROMYCIN 250 MG/1
TABLET, FILM COATED ORAL
Qty: 6 TABLET | Refills: 0 | Status: SHIPPED | OUTPATIENT
Start: 2025-01-08

## 2025-01-08 NOTE — TELEPHONE ENCOUNTER
C/o possible sinus infection and chest congestion x 1 week. Requesting Z pack to [facility] in [place]. Patient states she is unable to leave home d/t weather. Connected patient with Christian at Dr. Blanc's office.    Reason for Disposition   [1] Sinus congestion as part of a cold AND [2] present < 10 days    Additional Information   Negative: SEVERE difficulty breathing (e.g., struggling for each breath, speaks in single words)   Negative: Sounds like a life-threatening emergency to the triager   Negative: [1] Sinus infection AND [2] taking an antibiotic AND [3] symptoms continue   Negative: [1] Difficulty breathing AND [2] not from stuffy nose (e.g., not relieved by cleaning out the nose)   Negative: [1] SEVERE headache AND [2] fever   Negative: [1] Redness or swelling on the cheek, forehead or around the eye AND [2] fever   Negative: Fever > 104 F (40 C)   Negative: Patient sounds very sick or weak to the triager   Negative: [1] SEVERE pain AND [2] not improved 2 hours after pain medicine   Negative: [1] Redness or swelling on the cheek, forehead or around the eye AND [2] no fever   Negative: [1] Fever > 101 F (38.3 C) AND [2] age > 60 years   Negative: [1] Fever > 100.0 F (37.8 C) AND [2] bedridden (e.g., CVA, chronic illness, recovering from surgery)   Negative: [1] Fever > 100.0 F (37.8 C) AND [2] diabetes mellitus or weak immune system (e.g., HIV positive, cancer chemo, splenectomy, organ transplant, chronic steroids)   Negative: Fever present > 3 days (72 hours)   Negative: [1] Fever returns after gone for over 24 hours AND [2] symptoms worse or not improved   Negative: [1] Sinus pain (not just congestion) AND [2] fever   Negative: Earache   Negative: [1] Sinus congestion (pressure, fullness) AND [2] present > 10 days   Negative: [1] Nasal discharge AND [2] present > 10 days   Negative: [1] Using nasal washes and pain medicine > 24 hours AND [2] sinus pain (around cheekbone or eye) persists   Negative: Lots of  "coughing    Answer Assessment - Initial Assessment Questions  1. LOCATION: \"Where does it hurt?\"       Sinus pressure  2. ONSET: \"When did the sinus pain start?\"  (e.g., hours, days)       1 week ago  3. SEVERITY: \"How bad is the pain?\"   (Scale 1-10; mild, moderate or severe)    - MILD (1-3): doesn't interfere with normal activities     - MODERATE (4-7): interferes with normal activities (e.g., work or school) or awakens from sleep    - SEVERE (8-10): excruciating pain and patient unable to do any normal activities         Moderate   4. RECURRENT SYMPTOM: \"Have you ever had sinus problems before?\" If Yes, ask: \"When was the last time?\" and \"What happened that time?\"       N/A  5. NASAL CONGESTION: \"Is the nose blocked?\" If Yes, ask: \"Can you open it or must you breathe through your mouth?\"      No   6. NASAL DISCHARGE: \"Do you have discharge from your nose?\" If so ask, \"What color?\"      No   7. FEVER: \"Do you have a fever?\" If Yes, ask: \"What is it, how was it measured, and when did it start?\"       No   8. OTHER SYMPTOMS: \"Do you have any other symptoms?\" (e.g., sore throat, cough, earache, difficulty breathing)      Chest congestion  9. PREGNANCY: \"Is there any chance you are pregnant?\" \"When was your last menstrual period?\"      No    Protocols used: Sinus Pain or Congestion-ADULT-AH    "

## 2025-01-08 NOTE — TELEPHONE ENCOUNTER
Caller: Brandee Cabello    Relationship: Self    Best call back number: [phone]     What medication are you requesting: ZPACK    What are your current symptoms: CONGESTION, DRAINAGE, CHEST CONGESTION     If a prescription is needed, what is your preferred pharmacy and phone number: [id number] [address] [address] AT [facility] [address] - [phone]  - [phone]      Additional notes: PATIENT WAS EXPOSED TO PNEUMONIA AND IS WANTING A MEDICATION TO HELP WITH THE SYMPTOMS. PLEASE CALL WHEN MEDICATION HAS BEEN SENT.

## 2025-02-05 LAB
ALBUMIN SERPL-MCNC: 4.1 G/DL (ref 3.7–4.7)
ALP SERPL-CCNC: 100 IU/L (ref 44–121)
ALT SERPL-CCNC: 15 IU/L (ref 0–32)
AST SERPL-CCNC: 22 IU/L (ref 0–40)
BASOPHILS # BLD AUTO: 0.1 X10E3/UL (ref 0–0.2)
BASOPHILS NFR BLD AUTO: 1 %
BILIRUB SERPL-MCNC: 0.5 MG/DL (ref 0–1.2)
BUN SERPL-MCNC: 12 MG/DL (ref 8–27)
BUN/CREAT SERPL: 16 (ref 12–28)
CALCIUM SERPL-MCNC: 9.4 MG/DL (ref 8.7–10.3)
CHLORIDE SERPL-SCNC: 104 MMOL/L (ref 96–106)
CHOLEST SERPL-MCNC: 155 MG/DL (ref 100–199)
CO2 SERPL-SCNC: 26 MMOL/L (ref 20–29)
CREAT SERPL-MCNC: 0.74 MG/DL (ref 0.57–1)
EGFRCR SERPLBLD CKD-EPI 2021: 81 ML/MIN/1.73
EOSINOPHIL # BLD AUTO: 0.1 X10E3/UL (ref 0–0.4)
EOSINOPHIL NFR BLD AUTO: 2 %
ERYTHROCYTE [DISTWIDTH] IN BLOOD BY AUTOMATED COUNT: 13.1 % (ref 11.7–15.4)
GLOBULIN SER CALC-MCNC: 2.5 G/DL (ref 1.5–4.5)
GLUCOSE SERPL-MCNC: 114 MG/DL (ref 70–99)
HCT VFR BLD AUTO: 43 % (ref 34–46.6)
HDLC SERPL-MCNC: 52 MG/DL
HGB BLD-MCNC: 13.4 G/DL (ref 11.1–15.9)
IMM GRANULOCYTES # BLD AUTO: 0 X10E3/UL (ref 0–0.1)
IMM GRANULOCYTES NFR BLD AUTO: 0 %
LDLC SERPL CALC-MCNC: 88 MG/DL (ref 0–99)
LYMPHOCYTES # BLD AUTO: 1.2 X10E3/UL (ref 0.7–3.1)
LYMPHOCYTES NFR BLD AUTO: 27 %
MCH RBC QN AUTO: 28.2 PG (ref 26.6–33)
MCHC RBC AUTO-ENTMCNC: 31.2 G/DL (ref 31.5–35.7)
MCV RBC AUTO: 91 FL (ref 79–97)
MONOCYTES # BLD AUTO: 0.4 X10E3/UL (ref 0.1–0.9)
MONOCYTES NFR BLD AUTO: 8 %
NEUTROPHILS # BLD AUTO: 2.9 X10E3/UL (ref 1.4–7)
NEUTROPHILS NFR BLD AUTO: 62 %
PLATELET # BLD AUTO: 362 X10E3/UL (ref 150–450)
POTASSIUM SERPL-SCNC: 4.4 MMOL/L (ref 3.5–5.2)
PROT SERPL-MCNC: 6.6 G/DL (ref 6–8.5)
RBC # BLD AUTO: 4.75 X10E6/UL (ref 3.77–5.28)
SODIUM SERPL-SCNC: 143 MMOL/L (ref 134–144)
TRIGL SERPL-MCNC: 78 MG/DL (ref 0–149)
TSH SERPL DL<=0.005 MIU/L-ACNC: 2.92 UIU/ML (ref 0.45–4.5)
VLDLC SERPL CALC-MCNC: 15 MG/DL (ref 5–40)
WBC # BLD AUTO: 4.7 X10E3/UL (ref 3.4–10.8)